# Patient Record
Sex: FEMALE | Race: WHITE | NOT HISPANIC OR LATINO | Employment: OTHER | URBAN - METROPOLITAN AREA
[De-identification: names, ages, dates, MRNs, and addresses within clinical notes are randomized per-mention and may not be internally consistent; named-entity substitution may affect disease eponyms.]

---

## 2021-07-27 ENCOUNTER — HOSPITAL ENCOUNTER (OUTPATIENT)
Dept: GENERAL RADIOLOGY | Facility: HOSPITAL | Age: 76
Discharge: HOME | End: 2021-07-27
Admitting: PHYSICIAN ASSISTANT

## 2021-07-27 ENCOUNTER — TRANSCRIBE ORDERS (OUTPATIENT)
Dept: ADMINISTRATIVE | Facility: HOSPITAL | Age: 76
End: 2021-07-27

## 2021-07-27 DIAGNOSIS — R07.81 RIB PAIN ON LEFT SIDE: Primary | ICD-10-CM

## 2021-07-27 PROCEDURE — 71101 X-RAY EXAM UNILAT RIBS/CHEST: CPT

## 2021-08-12 ENCOUNTER — TRANSCRIBE ORDERS (OUTPATIENT)
Dept: ADMINISTRATIVE | Facility: HOSPITAL | Age: 76
End: 2021-08-12

## 2021-08-12 DIAGNOSIS — R27.8 DECREASED COORDINATION: Primary | ICD-10-CM

## 2021-09-14 ENCOUNTER — HOSPITAL ENCOUNTER (OUTPATIENT)
Dept: GENERAL RADIOLOGY | Facility: HOSPITAL | Age: 76
Discharge: HOME | End: 2021-09-14
Admitting: FAMILY MEDICINE

## 2021-09-14 ENCOUNTER — TRANSCRIBE ORDERS (OUTPATIENT)
Dept: ADMINISTRATIVE | Facility: HOSPITAL | Age: 76
End: 2021-09-14

## 2021-09-14 DIAGNOSIS — S22.42XA: Primary | ICD-10-CM

## 2021-09-14 DIAGNOSIS — S82.92XA: Primary | ICD-10-CM

## 2021-09-14 PROCEDURE — 71110 X-RAY EXAM RIBS BIL 3 VIEWS: CPT

## 2021-09-15 ENCOUNTER — OFFICE VISIT (OUTPATIENT)
Dept: NEUROLOGY | Facility: CLINIC | Age: 76
End: 2021-09-15

## 2021-09-15 ENCOUNTER — LAB (OUTPATIENT)
Dept: LAB | Facility: HOSPITAL | Age: 76
End: 2021-09-15

## 2021-09-15 VITALS
SYSTOLIC BLOOD PRESSURE: 124 MMHG | DIASTOLIC BLOOD PRESSURE: 78 MMHG | HEIGHT: 64 IN | BODY MASS INDEX: 25.61 KG/M2 | HEART RATE: 65 BPM | OXYGEN SATURATION: 98 % | WEIGHT: 150 LBS

## 2021-09-15 DIAGNOSIS — H34.9 RETINAL ARTERY OCCLUSION: ICD-10-CM

## 2021-09-15 DIAGNOSIS — R26.89 BALANCE DISORDER: Primary | ICD-10-CM

## 2021-09-15 DIAGNOSIS — R26.9 GAIT DISTURBANCE: ICD-10-CM

## 2021-09-15 DIAGNOSIS — F40.240 CLAUSTROPHOBIA: ICD-10-CM

## 2021-09-15 DIAGNOSIS — R00.2 PALPITATIONS: ICD-10-CM

## 2021-09-15 DIAGNOSIS — R07.89 OTHER CHEST PAIN: ICD-10-CM

## 2021-09-15 PROBLEM — H52.4 PRESBYOPIA: Status: ACTIVE | Noted: 2021-07-20

## 2021-09-15 PROBLEM — H47.393 SUSPICIOUS OPTIC NERVE CUPPING OF BOTH EYES: Status: ACTIVE | Noted: 2021-07-20

## 2021-09-15 PROBLEM — H34.11 CRAO (CENTRAL RETINAL ARTERY OCCLUSION), RIGHT: Status: ACTIVE | Noted: 2021-07-20

## 2021-09-15 PROBLEM — M79.7 FIBROMYALGIA: Status: ACTIVE | Noted: 2021-07-16

## 2021-09-15 PROBLEM — M81.0 OSTEOPOROSIS: Status: ACTIVE | Noted: 2021-07-16

## 2021-09-15 PROBLEM — H52.03 HYPEROPIA OF BOTH EYES: Status: ACTIVE | Noted: 2021-07-20

## 2021-09-15 PROBLEM — H25.813 COMBINED FORMS OF AGE-RELATED CATARACT OF BOTH EYES: Status: ACTIVE | Noted: 2021-07-20

## 2021-09-15 PROBLEM — H40.023 OPEN ANGLE WITH BORDERLINE FINDINGS, HIGH RISK, BILATERAL: Status: ACTIVE | Noted: 2021-07-20

## 2021-09-15 LAB
ALBUMIN SERPL-MCNC: 5.2 G/DL (ref 3.5–5.2)
ALBUMIN/GLOB SERPL: 2.4 G/DL
ALP SERPL-CCNC: 77 U/L (ref 39–117)
ALT SERPL W P-5'-P-CCNC: 30 U/L (ref 1–33)
ANION GAP SERPL CALCULATED.3IONS-SCNC: 12.4 MMOL/L (ref 5–15)
AST SERPL-CCNC: 22 U/L (ref 1–32)
BASOPHILS # BLD AUTO: 0.09 10*3/MM3 (ref 0–0.2)
BASOPHILS NFR BLD AUTO: 1 % (ref 0–1.5)
BILIRUB SERPL-MCNC: 0.4 MG/DL (ref 0–1.2)
BUN SERPL-MCNC: 13 MG/DL (ref 8–23)
BUN/CREAT SERPL: 12.6 (ref 7–25)
CALCIUM SPEC-SCNC: 9.9 MG/DL (ref 8.6–10.5)
CHLORIDE SERPL-SCNC: 100 MMOL/L (ref 98–107)
CHROMATIN AB SERPL-ACNC: <10 IU/ML (ref 0–14)
CO2 SERPL-SCNC: 26.6 MMOL/L (ref 22–29)
CREAT SERPL-MCNC: 1.03 MG/DL (ref 0.57–1)
CRP SERPL-MCNC: <0.3 MG/DL (ref 0–0.5)
DEPRECATED RDW RBC AUTO: 44.9 FL (ref 37–54)
EOSINOPHIL # BLD AUTO: 0.41 10*3/MM3 (ref 0–0.4)
EOSINOPHIL NFR BLD AUTO: 4.5 % (ref 0.3–6.2)
ERYTHROCYTE [DISTWIDTH] IN BLOOD BY AUTOMATED COUNT: 12.7 % (ref 12.3–15.4)
ERYTHROCYTE [SEDIMENTATION RATE] IN BLOOD: 19 MM/HR (ref 0–30)
GFR SERPL CREATININE-BSD FRML MDRD: 52 ML/MIN/1.73
GLOBULIN UR ELPH-MCNC: 2.2 GM/DL
GLUCOSE SERPL-MCNC: 83 MG/DL (ref 65–99)
HCT VFR BLD AUTO: 44.9 % (ref 34–46.6)
HGB BLD-MCNC: 14.5 G/DL (ref 12–15.9)
IMM GRANULOCYTES # BLD AUTO: 0.04 10*3/MM3 (ref 0–0.05)
IMM GRANULOCYTES NFR BLD AUTO: 0.4 % (ref 0–0.5)
LYMPHOCYTES # BLD AUTO: 1.85 10*3/MM3 (ref 0.7–3.1)
LYMPHOCYTES NFR BLD AUTO: 20.3 % (ref 19.6–45.3)
MCH RBC QN AUTO: 31.5 PG (ref 26.6–33)
MCHC RBC AUTO-ENTMCNC: 32.3 G/DL (ref 31.5–35.7)
MCV RBC AUTO: 97.6 FL (ref 79–97)
MONOCYTES # BLD AUTO: 0.66 10*3/MM3 (ref 0.1–0.9)
MONOCYTES NFR BLD AUTO: 7.2 % (ref 5–12)
NEUTROPHILS NFR BLD AUTO: 6.06 10*3/MM3 (ref 1.7–7)
NEUTROPHILS NFR BLD AUTO: 66.6 % (ref 42.7–76)
NRBC BLD AUTO-RTO: 0 /100 WBC (ref 0–0.2)
PLATELET # BLD AUTO: 240 10*3/MM3 (ref 140–450)
PMV BLD AUTO: 11.4 FL (ref 6–12)
POTASSIUM SERPL-SCNC: 4.5 MMOL/L (ref 3.5–5.2)
PROT SERPL-MCNC: 7.4 G/DL (ref 6–8.5)
RBC # BLD AUTO: 4.6 10*6/MM3 (ref 3.77–5.28)
SODIUM SERPL-SCNC: 139 MMOL/L (ref 136–145)
WBC # BLD AUTO: 9.11 10*3/MM3 (ref 3.4–10.8)

## 2021-09-15 PROCEDURE — 86255 FLUORESCENT ANTIBODY SCREEN: CPT

## 2021-09-15 PROCEDURE — 84446 ASSAY OF VITAMIN E: CPT

## 2021-09-15 PROCEDURE — 83519 RIA NONANTIBODY: CPT

## 2021-09-15 PROCEDURE — 82390 ASSAY OF CERULOPLASMIN: CPT

## 2021-09-15 PROCEDURE — 36415 COLL VENOUS BLD VENIPUNCTURE: CPT

## 2021-09-15 PROCEDURE — 86038 ANTINUCLEAR ANTIBODIES: CPT

## 2021-09-15 PROCEDURE — 82746 ASSAY OF FOLIC ACID SERUM: CPT

## 2021-09-15 PROCEDURE — 81241 F5 GENE: CPT

## 2021-09-15 PROCEDURE — 82164 ANGIOTENSIN I ENZYME TEST: CPT

## 2021-09-15 PROCEDURE — 85652 RBC SED RATE AUTOMATED: CPT

## 2021-09-15 PROCEDURE — 85025 COMPLETE CBC W/AUTO DIFF WBC: CPT

## 2021-09-15 PROCEDURE — 86431 RHEUMATOID FACTOR QUANT: CPT

## 2021-09-15 PROCEDURE — 82607 VITAMIN B-12: CPT

## 2021-09-15 PROCEDURE — 99204 OFFICE O/P NEW MOD 45 MIN: CPT | Performed by: NURSE PRACTITIONER

## 2021-09-15 PROCEDURE — 80053 COMPREHEN METABOLIC PANEL: CPT

## 2021-09-15 PROCEDURE — 86140 C-REACTIVE PROTEIN: CPT

## 2021-09-15 RX ORDER — BUPROPION HYDROCHLORIDE 300 MG/1
300 TABLET ORAL EVERY MORNING
COMMUNITY
Start: 2021-06-30

## 2021-09-15 RX ORDER — METOPROLOL SUCCINATE 100 MG/1
50 TABLET, EXTENDED RELEASE ORAL DAILY
COMMUNITY
End: 2022-03-15 | Stop reason: SDUPTHER

## 2021-09-15 RX ORDER — ASPIRIN 81 MG/1
1 TABLET ORAL DAILY
COMMUNITY

## 2021-09-15 RX ORDER — MELATONIN: COMMUNITY

## 2021-09-15 RX ORDER — DIAZEPAM 5 MG/1
TABLET ORAL
Qty: 4 TABLET | Refills: 0 | Status: SHIPPED | OUTPATIENT
Start: 2021-09-15 | End: 2021-11-18

## 2021-09-15 RX ORDER — OLMESARTAN MEDOXOMIL 40 MG/1
40 TABLET ORAL DAILY
COMMUNITY
Start: 2021-05-27 | End: 2021-10-26 | Stop reason: SDUPTHER

## 2021-09-15 RX ORDER — APIXABAN 5 MG/1
5 TABLET, FILM COATED ORAL 2 TIMES DAILY
COMMUNITY
Start: 2021-07-16

## 2021-09-15 RX ORDER — ESCITALOPRAM OXALATE 20 MG/1
20 TABLET ORAL NIGHTLY
COMMUNITY

## 2021-09-15 RX ORDER — CHLORAL HYDRATE 500 MG
CAPSULE ORAL
COMMUNITY

## 2021-09-15 RX ORDER — BUSPIRONE HYDROCHLORIDE 10 MG/1
10 TABLET ORAL 3 TIMES DAILY
COMMUNITY
Start: 2021-08-02

## 2021-09-15 RX ORDER — TRAMADOL HYDROCHLORIDE 50 MG/1
50 TABLET ORAL EVERY 8 HOURS PRN
COMMUNITY
End: 2021-11-18

## 2021-09-15 RX ORDER — DULOXETIN HYDROCHLORIDE 30 MG/1
30 CAPSULE, DELAYED RELEASE ORAL DAILY
COMMUNITY
Start: 2021-09-09 | End: 2021-11-18

## 2021-09-15 RX ORDER — TIMOLOL MALEATE 5 MG/ML
1 SOLUTION/ DROPS OPHTHALMIC DAILY
COMMUNITY

## 2021-09-15 RX ORDER — ATORVASTATIN CALCIUM 10 MG/1
10 TABLET, FILM COATED ORAL
COMMUNITY
Start: 2021-08-02 | End: 2022-02-22 | Stop reason: ALTCHOICE

## 2021-09-15 RX ORDER — ZOLPIDEM TARTRATE 12.5 MG/1
12.5 TABLET, FILM COATED, EXTENDED RELEASE ORAL NIGHTLY
COMMUNITY
Start: 2021-07-01

## 2021-09-15 RX ORDER — FOLIC ACID 1 MG/1
1 TABLET ORAL DAILY
COMMUNITY
Start: 2021-06-02

## 2021-09-15 RX ORDER — LATANOPROST 50 UG/ML
SOLUTION/ DROPS OPHTHALMIC
COMMUNITY

## 2021-09-15 RX ORDER — DENOSUMAB 60 MG/ML
1 INJECTION SUBCUTANEOUS
COMMUNITY

## 2021-09-15 NOTE — PROGRESS NOTES
Neuro Office Visit      Encounter Date: 09/15/2021   Patient Name: Marilyn Bernal  : 1945   MRN: [id number]   PCP: Dr Colleen Redyd- THOMAS  Chief Complaint:    Chief Complaint   Patient presents with   • Balance Issues     NP       History of Present Illness: Marilyn Bernal is a 75 y.o. female who is here today in Neurology with her  for balance problems and unsteady gait.    Balance problems  Onset 2019 after arm fracture. Symptoms are progressing.  Has had multiple falls mostly at night. Has some dizziness upon standing that clears spontaneously in a few seconds. This was thought to be orthostatic in nature.     Her major symptoms is poor balance. It is worse when she is outside in the yard and stands up. She feels she is swaying and symptoms are exacerbated if she turns her head. Symptoms are better in the house or hallway. No nystagmus. No nausea. Episodes will last until she sits down. She is chronically weak.  She does not use a cane or a walker. After a pelvic fracture she used a walker and did not have balance issues.     Retinal artery occlusion  History or right retinal artery occlusion with loss of near 100% vision on right. Can see some shadows in right lateral peripheral vision. Did not have CT or MRI due to severe claustrophobia. Has been on Eliquis, and statin since 2017.  She has limited hearing in right ear.    Chest pain  Had had some chest pain and tachycardia prior to retinal occlusion. Saw cardiology. Started on beta blocker. Did not wear a holter. Did have Echo.     One week ago had chest pain. Pressure sensation in central chest and radiated to left and right chest.  Occurred at rest and with exertion. Not worse with exertion.  Worse with movement. + SOB. Neg palpitations, nausea or diaphoresis. Symptom duration 3-10 minutes. Denies cp today.      Has lupus, depression that is not well controlled. Has not seen a psychiatrist and declines referral at this time. Moderate  etoh use.      Subjective      Past Medical History:   Past Medical History:   Diagnosis Date   • Abnormal gait     Reported by patient-slower, smaller steps   • Anxiety    • Arthritis    • Cataracts, bilateral    • Fibromyalgia, primary    • Fracture    • HL (hearing loss)    • Hypertension    • IBS (irritable bowel syndrome)    • Migraine        Past Surgical History:   Past Surgical History:   Procedure Laterality Date   • LIPOMA EXCISION      Removal-1984 in [facility]   • MIDDLE EAR SURGERY  1967    [place], [place]       Family History:   Family History   Problem Relation Age of Onset   • Cancer Mother    • Osteoporosis Mother    • Stroke Mother    • Heart attack Father    • Migraines Son        Social History:   Social History     Socioeconomic History   • Marital status:      Spouse name: Not on file   • Number of children: Not on file   • Years of education: Not on file   • Highest education level: Not on file   Tobacco Use   • Smoking status: Never Smoker   • Smokeless tobacco: Never Used   Vaping Use   • Vaping Use: Never used   Substance and Sexual Activity   • Alcohol use: Yes     Alcohol/week: 7.0 standard drinks     Types: 7 Standard drinks or equivalent per week   • Drug use: Never   • Sexual activity: Defer       Medications:     Current Outpatient Medications:   •  aspirin (aspirin) 81 MG EC tablet, Take 1 tablet by mouth Daily., Disp: , Rfl:   •  atorvastatin (LIPITOR) 10 MG tablet, Take 10 mg by mouth every night at bedtime., Disp: , Rfl:   •  buPROPion XL (WELLBUTRIN XL) 300 MG 24 hr tablet, bupropion HCl  mg 24 hr tablet, extended release, Disp: , Rfl:   •  busPIRone (BUSPAR) 10 MG tablet, Take 10 mg by mouth 3 (Three) Times a Day., Disp: , Rfl:   •  calcium carbonate-vitamin d 600-400 MG-UNIT per tablet, bid, Disp: , Rfl:   •  cholecalciferol (Cholecalciferol) 25 MCG (1000 UT) tablet, once daily, Disp: , Rfl:   •  denosumab (Prolia) 60 MG/ML solution prefilled syringe  syringe, 1 mL., Disp: , Rfl:   •  Diclofenac Sodium (VOLTAREN) 1 % gel gel, diclofenac 1 % topical gel, Disp: , Rfl:   •  DULoxetine (CYMBALTA) 30 MG capsule, Take 30 mg by mouth Daily., Disp: , Rfl:   •  Eliquis 5 MG tablet tablet, Take 5 mg by mouth 2 (Two) Times a Day., Disp: , Rfl:   •  escitalopram (LEXAPRO) 20 MG tablet, escitalopram 20 mg tablet, Disp: , Rfl:   •  folic acid (FOLVITE) 1 MG tablet, folic acid 1 mg tablet, Disp: , Rfl:   •  latanoprost (XALATAN) 0.005 % ophthalmic solution, latanoprost 0.005 % eye drops  INSTILL 1 DROP INTO AFFECTED EYE(S) BY OPHTHALMIC ROUTE ONCE DAILY INTHE EVENING, Disp: , Rfl:   •  metoprolol succinate XL (TOPROL-XL) 100 MG 24 hr tablet, metoprolol succinate  mg tablet,extended release 24 hr, Disp: , Rfl:   •  olmesartan (BENICAR) 40 MG tablet, olmesartan 40 mg tablet, Disp: , Rfl:   •  Omega-3 Fatty Acids (fish oil) 1000 MG capsule capsule, Take  by mouth Daily With Breakfast., Disp: , Rfl:   •  timolol (TIMOPTIC) 0.5 % ophthalmic solution, timolol 0.5 % eye drops  INSTILL 1 DROP INTO AFFECTED EYE(S) BY OPHTHALMIC ROUTE 2 TIMES PER DAY, Disp: , Rfl:   •  traMADol (ULTRAM) 50 MG tablet, tramadol 50 mg tablet, Disp: , Rfl:   •  zolpidem CR (AMBIEN CR) 12.5 MG CR tablet, zolpidem ER 12.5 mg tablet,extended release,multiphase, Disp: , Rfl:   •  diazePAM (Valium) 5 MG tablet, Take 1 tab 1 hours before procedure., Disp: 4 tablet, Rfl: 0    Allergies: KNDA      STEADI: high risk for falls.  Objective     Physical Exam:   Physical Exam  Neurological:      Mental Status: She is oriented to person, place, and time.      Coordination: Romberg Test abnormal. Finger-Nose-Finger Test normal.      Gait: Tandem walk abnormal.      Deep Tendon Reflexes:      Reflex Scores:       Tricep reflexes are 2+ on the right side and 2+ on the left side.       Bicep reflexes are 2+ on the right side and 2+ on the left side.       Brachioradialis reflexes are 2+ on the right side and 2+ on the  left side.       Patellar reflexes are 2+ on the right side and 2+ on the left side.       Achilles reflexes are 2+ on the right side and 2+ on the left side.  Psychiatric:         Speech: Speech normal.         Neurologic Exam     Mental Status   Oriented to person, place, and time.   Follows 3 step commands.   Attention: normal. Concentration: normal.   Speech: speech is normal   Level of consciousness: alert  Knowledge: consistent with education.   Normal comprehension.     Cranial Nerves     CN II   Visual acuity: decreased  Right visual field deficit: lower temporal, upper temporal, lower nasal and upper nasal quadrant(s)    CN III, IV, VI   Right pupil: Size: 3 mm. Shape: regular. Reactivity: non-reactive. Consensual response: intact. Accommodation: intact.   Left pupil: Size: 3 mm. Shape: regular. Reactivity: brisk. Consensual response: intact. Accommodation: intact.   CN III: no CN III palsy  CN VI: no CN VI palsy  Nystagmus: none   Diplopia: none  Upgaze: normal  Downgaze: normal  Conjugate gaze: present    CN VII   Facial expression full, symmetric.     CN VIII   Hearing: intact    CN XII   CN XII normal.     Motor Exam   Muscle bulk: normal  Overall muscle tone: normal    Strength   Right biceps: 5/5  Left biceps: 5/5  Right triceps: 5/5  Left triceps: 5/5  Right interossei: 5/5  Left interossei: 5/5  Right quadriceps: 5/5  Left quadriceps: 5/5  Right anterior tibial: 5/5  Left anterior tibial: 5/5  Right posterior tibial: 5/5  Left posterior tibial: 5/5    Sensory Exam   Light touch normal.     Gait, Coordination, and Reflexes     Gait  Gait: circumduction and wide-based    Coordination   Romberg: positive  Finger to nose coordination: normal  Tandem walking coordination: abnormal    Tremor   Resting tremor: absent  Action tremor: absent    Reflexes   Right brachioradialis: 2+  Left brachioradialis: 2+  Right biceps: 2+  Left biceps: 2+  Right triceps: 2+  Left triceps: 2+  Right patellar: 2+  Left  "patellar: 2+  Right achilles: 2+  Left achilles: 2+  Right : 2+  Left : 2+Right leg circumduction        Vital Signs:   Vitals:    09/15/21 1004   BP: 124/78   Pulse: 65   SpO2: 98%   Weight: 68 kg (150 lb)   Height: 162.6 cm (64\")     Body mass index is 25.75 kg/m².       Assessment / Plan      Assessment/Plan:   Diagnoses and all orders for this visit:    1. Balance disorder (Primary)  -     MRI Brain With & Without Contrast; Future  -     Vascular Screening (Carotid) CAR; Future  -     Ambulatory Referral to Physical Therapy Evaluate and treat, Vestibular, Neuro    2. Gait disturbance  -     MRI Brain With & Without Contrast; Future  -     DARLING by IFA, Reflex 9-biomarkers profile; Future  -     Angiotensin Converting Enzyme; Future  -     Anti-Myelin Oligodendrocyte Glycoprotein (MOG), Serum; Future  -     CBC & Differential; Future  -     Comprehensive Metabolic Panel; Future  -     Factor 5 Leiden; Future  -     Myasthenia Gravis Full Panel w/MuSK Reflex; Future  -     Neuromyelitis Optica (NMO) Auto Antibody, IgG; Future  -     Rheumatoid Factor; Future  -     Vitamin B12 & Folate; Future  -     C-reactive Protein; Future  -     Sedimentation Rate; Future  -     Ceruloplasmin; Future  -     Vitamin E; Future  -     Ambulatory Referral to Physical Therapy Evaluate and treat, Vestibular, Neuro    3. Retinal artery occlusion  -     MRI Brain With & Without Contrast; Future  -     Vascular Screening (Carotid) CAR; Future  -     Adult Transthoracic Echo Complete W/ Cont if Necessary Per Protocol; Future  -     Factor 5 Leiden; Future  -     Ambulatory Referral to Cardiology    4. Other chest pain  -     Holter Monitor - 72 Hour Up To 15 Days; Future  -     Adult Transthoracic Echo Complete W/ Cont if Necessary Per Protocol; Future  -     Ambulatory Referral to Cardiology    5. Palpitations  -     Vascular Screening (Carotid) CAR; Future  -     Holter Monitor - 72 Hour Up To 15 Days; Future  -     Adult " Transthoracic Echo Complete W/ Cont if Necessary Per Protocol; Future  -     Ambulatory Referral to Cardiology    6. Claustrophobia  -     diazePAM (Valium) 5 MG tablet; Take 1 tab 1 hours before procedure.  Dispense: 4 tablet; Refill: 0       Discussed importance of obtaining MRI to make accurate diagnosis and plan of care. Will premedicate with Valium. Trial first dose at home the day before. May repeat in one hour if not effective.    As a part of this patient's therapy a controlled substance was prescribed. Instructed on the safe and proper use of this medication along with potential risks. Controlled substance contract signed and scanned. Lorenzo will be reviewed and UDS obtained as indicated.      Patient Education:   Stressed the importance of safety and recommend she use a walker at all times.    Go to ER for recurrent chest pain.  Reviewed signs and symptoms of heart attack and stroke.     Reviewed medications, potential side effects and signs and symptoms to report. Discussed risk versus benefits of treatment plan with patient and/or family-including medications, labs and radiology that may be ordered. Addressed questions and concerns during visit. Patient and/or family verbalized understanding and agree with plan. Instructed to call the office with any questions and report to ER with any life-threatening symptoms.     Follow Up:   6 weeks    During this visit the following were done:  Labs Reviewed [x]    Labs Ordered [x]    Radiology Reports Reviewed []    Radiology Ordered [x]    PCP Records Reviewed [x]    Referring Provider Records Reviewed []    ER Records Reviewed []    Hospital Records Reviewed []    History Obtained From Family [x]    Radiology Images Reviewed []    Other Reviewed [x]    Records Requested []      Thaddeus Milligan, DNP, APRN

## 2021-09-16 LAB
CERULOPLASMIN SERPL-MCNC: 33 MG/DL (ref 19–39)
FOLATE SERPL-MCNC: >20 NG/ML (ref 4.78–24.2)
VIT B12 BLD-MCNC: 990 PG/ML (ref 211–946)

## 2021-09-17 LAB
ACE SERPL-CCNC: 44 U/L (ref 14–82)
ANA TITR SER IF: NEGATIVE {TITER}
F5 GENE MUT ANL BLD/T: NORMAL
LABORATORY COMMENT REPORT: NORMAL

## 2021-09-20 DIAGNOSIS — R00.2 PALPITATIONS: ICD-10-CM

## 2021-09-20 DIAGNOSIS — R26.89 BALANCE DISORDER: Primary | ICD-10-CM

## 2021-09-20 DIAGNOSIS — H34.9 RETINAL ARTERY OCCLUSION: ICD-10-CM

## 2021-09-20 LAB — MOG AB SER QL CBA IFA: NEGATIVE

## 2021-09-22 LAB
A-TOCOPHEROL VIT E SERPL-MCNC: 15.9 MG/L (ref 9–29)
AQP4 H2O CHANNEL IGG SERPL QL: NEGATIVE
GAMMA TOCOPHEROL SERPL-MCNC: 0.9 MG/L (ref 0.5–4.9)

## 2021-09-29 ENCOUNTER — TELEPHONE (OUTPATIENT)
Dept: NEUROLOGY | Facility: CLINIC | Age: 76
End: 2021-09-29

## 2021-10-06 ENCOUNTER — APPOINTMENT (OUTPATIENT)
Dept: MRI IMAGING | Facility: HOSPITAL | Age: 76
End: 2021-10-06

## 2021-10-10 ENCOUNTER — APPOINTMENT (OUTPATIENT)
Dept: MRI IMAGING | Facility: HOSPITAL | Age: 76
End: 2021-10-10

## 2021-10-15 ENCOUNTER — TRANSCRIBE ORDERS (OUTPATIENT)
Dept: ADMINISTRATIVE | Facility: HOSPITAL | Age: 76
End: 2021-10-15

## 2021-10-15 DIAGNOSIS — Z11.59 ENCOUNTER FOR SCREENING FOR VIRAL DISEASE: Primary | ICD-10-CM

## 2021-10-19 ENCOUNTER — HOSPITAL ENCOUNTER (OUTPATIENT)
Dept: CARDIOLOGY | Facility: HOSPITAL | Age: 76
Discharge: HOME | End: 2021-10-19

## 2021-10-19 VITALS — BODY MASS INDEX: 25.61 KG/M2 | HEIGHT: 64 IN | WEIGHT: 150 LBS

## 2021-10-19 DIAGNOSIS — R00.2 PALPITATIONS: ICD-10-CM

## 2021-10-19 DIAGNOSIS — H34.9 RETINAL ARTERY OCCLUSION: ICD-10-CM

## 2021-10-19 DIAGNOSIS — R07.89 OTHER CHEST PAIN: ICD-10-CM

## 2021-10-19 DIAGNOSIS — R26.89 BALANCE DISORDER: ICD-10-CM

## 2021-10-19 LAB
ASCENDING AORTA: 3.3 CM
BH CV ECHO MEAS - AO MAX PG (FULL): 4.3 MMHG
BH CV ECHO MEAS - AO MAX PG: 8 MMHG
BH CV ECHO MEAS - AO MEAN PG (FULL): 2 MMHG
BH CV ECHO MEAS - AO MEAN PG: 3.7 MMHG
BH CV ECHO MEAS - AO ROOT AREA (BSA CORRECTED): 1.6
BH CV ECHO MEAS - AO ROOT AREA: 6 CM^2
BH CV ECHO MEAS - AO ROOT DIAM: 2.8 CM
BH CV ECHO MEAS - AO V2 MAX: 141.2 CM/SEC
BH CV ECHO MEAS - AO V2 MEAN: 88.2 CM/SEC
BH CV ECHO MEAS - AO V2 VTI: 30.7 CM
BH CV ECHO MEAS - ASC AORTA: 3.3 CM
BH CV ECHO MEAS - AVA(I,A): 2.4 CM^2
BH CV ECHO MEAS - AVA(I,D): 2.4 CM^2
BH CV ECHO MEAS - AVA(V,A): 2.1 CM^2
BH CV ECHO MEAS - AVA(V,D): 2.1 CM^2
BH CV ECHO MEAS - BSA(HAYCOCK): 1.8 M^2
BH CV ECHO MEAS - BSA: 1.7 M^2
BH CV ECHO MEAS - BZI_BMI: 25.7 KILOGRAMS/M^2
BH CV ECHO MEAS - BZI_METRIC_HEIGHT: 162.6 CM
BH CV ECHO MEAS - BZI_METRIC_WEIGHT: 68 KG
BH CV ECHO MEAS - EDV(CUBED): 47.3 ML
BH CV ECHO MEAS - EDV(MOD-SP2): 45 ML
BH CV ECHO MEAS - EDV(MOD-SP4): 51 ML
BH CV ECHO MEAS - EDV(TEICH): 55.1 ML
BH CV ECHO MEAS - EF(CUBED): 80.8 %
BH CV ECHO MEAS - EF(MOD-BP): 63 %
BH CV ECHO MEAS - EF(MOD-SP2): 64.4 %
BH CV ECHO MEAS - EF(MOD-SP4): 62.7 %
BH CV ECHO MEAS - EF(TEICH): 74.3 %
BH CV ECHO MEAS - ESV(CUBED): 9.1 ML
BH CV ECHO MEAS - ESV(MOD-SP2): 16 ML
BH CV ECHO MEAS - ESV(MOD-SP4): 19 ML
BH CV ECHO MEAS - ESV(TEICH): 14.2 ML
BH CV ECHO MEAS - FS: 42.4 %
BH CV ECHO MEAS - IVS/LVPW: 0.84
BH CV ECHO MEAS - IVSD: 1 CM
BH CV ECHO MEAS - LA DIMENSION: 3.3 CM
BH CV ECHO MEAS - LA/AO: 1.2
BH CV ECHO MEAS - LAD MAJOR: 5.1 CM
BH CV ECHO MEAS - LAT PEAK E' VEL: 6.7 CM/SEC
BH CV ECHO MEAS - LATERAL E/E' RATIO: 10.7
BH CV ECHO MEAS - LV DIASTOLIC VOL/BSA (35-75): 29.5 ML/M^2
BH CV ECHO MEAS - LV IVRT: 0.06 SEC
BH CV ECHO MEAS - LV MASS(C)D: 115.5 GRAMS
BH CV ECHO MEAS - LV MASS(C)DI: 66.7 GRAMS/M^2
BH CV ECHO MEAS - LV MAX PG: 3.7 MMHG
BH CV ECHO MEAS - LV MEAN PG: 1.7 MMHG
BH CV ECHO MEAS - LV SYSTOLIC VOL/BSA (12-30): 11 ML/M^2
BH CV ECHO MEAS - LV V1 MAX: 95.6 CM/SEC
BH CV ECHO MEAS - LV V1 MEAN: 57.5 CM/SEC
BH CV ECHO MEAS - LV V1 VTI: 24.1 CM
BH CV ECHO MEAS - LVIDD: 3.6 CM
BH CV ECHO MEAS - LVIDS: 2.1 CM
BH CV ECHO MEAS - LVLD AP2: 6 CM
BH CV ECHO MEAS - LVLD AP4: 6.2 CM
BH CV ECHO MEAS - LVLS AP2: 4.6 CM
BH CV ECHO MEAS - LVLS AP4: 5 CM
BH CV ECHO MEAS - LVOT AREA (M): 3.1 CM^2
BH CV ECHO MEAS - LVOT AREA: 3 CM^2
BH CV ECHO MEAS - LVOT DIAM: 2 CM
BH CV ECHO MEAS - LVPWD: 1.1 CM
BH CV ECHO MEAS - MED PEAK E' VEL: 5 CM/SEC
BH CV ECHO MEAS - MEDIAL E/E' RATIO: 14.2
BH CV ECHO MEAS - MV A MAX VEL: 101.8 CM/SEC
BH CV ECHO MEAS - MV DEC SLOPE: 299.7 CM/SEC^2
BH CV ECHO MEAS - MV DEC TIME: 0.25 SEC
BH CV ECHO MEAS - MV E MAX VEL: 73.3 CM/SEC
BH CV ECHO MEAS - MV E/A: 0.72
BH CV ECHO MEAS - MV MAX PG: 6.1 MMHG
BH CV ECHO MEAS - MV MEAN PG: 2.2 MMHG
BH CV ECHO MEAS - MV P1/2T MAX VEL: 93.1 CM/SEC
BH CV ECHO MEAS - MV P1/2T: 91 MSEC
BH CV ECHO MEAS - MV V2 MAX: 123.3 CM/SEC
BH CV ECHO MEAS - MV V2 MEAN: 68.3 CM/SEC
BH CV ECHO MEAS - MV V2 VTI: 29.4 CM
BH CV ECHO MEAS - MVA P1/2T LCG: 2.4 CM^2
BH CV ECHO MEAS - MVA(P1/2T): 2.4 CM^2
BH CV ECHO MEAS - MVA(VTI): 2.5 CM^2
BH CV ECHO MEAS - PA ACC SLOPE: 434.6 CM/SEC^2
BH CV ECHO MEAS - PA ACC TIME: 0.18 SEC
BH CV ECHO MEAS - PA MAX PG: 3.5 MMHG
BH CV ECHO MEAS - PA PR(ACCEL): -0.22 MMHG
BH CV ECHO MEAS - PA V2 MAX: 93.9 CM/SEC
BH CV ECHO MEAS - PI END-D VEL: 108.6 CM/SEC
BH CV ECHO MEAS - RAP SYSTOLE: 3 MMHG
BH CV ECHO MEAS - RVSP: 31 MMHG
BH CV ECHO MEAS - SI(AO): 105.9 ML/M^2
BH CV ECHO MEAS - SI(CUBED): 22.1 ML/M^2
BH CV ECHO MEAS - SI(LVOT): 42.3 ML/M^2
BH CV ECHO MEAS - SI(MOD-SP2): 16.8 ML/M^2
BH CV ECHO MEAS - SI(MOD-SP4): 18.5 ML/M^2
BH CV ECHO MEAS - SI(TEICH): 23.6 ML/M^2
BH CV ECHO MEAS - SV(AO): 183.3 ML
BH CV ECHO MEAS - SV(CUBED): 38.3 ML
BH CV ECHO MEAS - SV(LVOT): 73.3 ML
BH CV ECHO MEAS - SV(MOD-SP2): 29 ML
BH CV ECHO MEAS - SV(MOD-SP4): 32 ML
BH CV ECHO MEAS - SV(TEICH): 40.9 ML
BH CV ECHO MEAS - TAPSE (>1.6): 2.4 CM
BH CV ECHO MEAS - TR MAX PG: 28 MMHG
BH CV ECHO MEAS - TR MAX VEL: 263.9 CM/SEC
BH CV ECHO MEASUREMENTS AVERAGE E/E' RATIO: 12.53
BH CV VAS BP LEFT ARM: NORMAL MMHG
BH CV XLRA - RV BASE: 2.8 CM
BH CV XLRA - RV LENGTH: 5.6 CM
BH CV XLRA - RV MID: 2.1 CM
BH CV XLRA - TDI S': 10.3 CM/SEC
BH CV XLRA MEAS LEFT CCA RATIO VEL: 80.5 CM/SEC
BH CV XLRA MEAS LEFT DIST CCA EDV: 21.6 CM/SEC
BH CV XLRA MEAS LEFT DIST CCA PSV: 84 CM/SEC
BH CV XLRA MEAS LEFT DIST ICA EDV: 21.6 CM/SEC
BH CV XLRA MEAS LEFT DIST ICA PSV: 70.3 CM/SEC
BH CV XLRA MEAS LEFT ICA RATIO VEL: 60.5 CM/SEC
BH CV XLRA MEAS LEFT ICA/CCA RATIO: 0.75
BH CV XLRA MEAS LEFT MID CCA EDV: 13.8 CM/SEC
BH CV XLRA MEAS LEFT MID CCA PSV: 81 CM/SEC
BH CV XLRA MEAS LEFT MID ICA EDV: 18.9 CM/SEC
BH CV XLRA MEAS LEFT MID ICA PSV: 58.9 CM/SEC
BH CV XLRA MEAS LEFT PROX CCA EDV: 22.6 CM/SEC
BH CV XLRA MEAS LEFT PROX CCA PSV: 101.2 CM/SEC
BH CV XLRA MEAS LEFT PROX ECA EDV: 14.9 CM/SEC
BH CV XLRA MEAS LEFT PROX ECA PSV: 93.2 CM/SEC
BH CV XLRA MEAS LEFT PROX ICA EDV: 15.3 CM/SEC
BH CV XLRA MEAS LEFT PROX ICA PSV: 60.9 CM/SEC
BH CV XLRA MEAS LEFT PROX SCLA PSV: 130 CM/SEC
BH CV XLRA MEAS LEFT VERTEBRAL A EDV: 6.5 CM/SEC
BH CV XLRA MEAS LEFT VERTEBRAL A PSV: 38.2 CM/SEC
BH CV XLRA MEAS RIGHT CCA RATIO VEL: 87.1 CM/SEC
BH CV XLRA MEAS RIGHT DIST CCA EDV: 23.2 CM/SEC
BH CV XLRA MEAS RIGHT DIST CCA PSV: 90.4 CM/SEC
BH CV XLRA MEAS RIGHT DIST ICA EDV: 24 CM/SEC
BH CV XLRA MEAS RIGHT DIST ICA PSV: 73.3 CM/SEC
BH CV XLRA MEAS RIGHT ICA RATIO VEL: 86 CM/SEC
BH CV XLRA MEAS RIGHT ICA/CCA RATIO: 0.99
BH CV XLRA MEAS RIGHT MID CCA EDV: 17.1 CM/SEC
BH CV XLRA MEAS RIGHT MID CCA PSV: 87.7 CM/SEC
BH CV XLRA MEAS RIGHT MID ICA EDV: 20.1 CM/SEC
BH CV XLRA MEAS RIGHT MID ICA PSV: 71.2 CM/SEC
BH CV XLRA MEAS RIGHT PROX CCA EDV: 16.3 CM/SEC
BH CV XLRA MEAS RIGHT PROX CCA PSV: 85.5 CM/SEC
BH CV XLRA MEAS RIGHT PROX ECA EDV: 16.3 CM/SEC
BH CV XLRA MEAS RIGHT PROX ECA PSV: 118.8 CM/SEC
BH CV XLRA MEAS RIGHT PROX ICA EDV: 19.3 CM/SEC
BH CV XLRA MEAS RIGHT PROX ICA PSV: 78.9 CM/SEC
BH CV XLRA MEAS RIGHT PROX SCLA PSV: 225 CM/SEC
BH CV XLRA MEAS RIGHT VERTEBRAL A EDV: 10.4 CM/SEC
BH CV XLRA MEAS RIGHT VERTEBRAL A PSV: 40.1 CM/SEC
LEFT ARM BP: NORMAL MMHG
LEFT ATRIUM VOLUME INDEX: 19.1 ML/M^2
LEFT ATRIUM VOLUME: 33 ML
LV EF 2D ECHO EST: 65 %
MAXIMAL PREDICTED HEART RATE: 146 BPM
RIGHT ARM BP: NORMAL MMHG
STRESS TARGET HR: 124 BPM

## 2021-10-19 PROCEDURE — 93880 EXTRACRANIAL BILAT STUDY: CPT

## 2021-10-19 PROCEDURE — 93306 TTE W/DOPPLER COMPLETE: CPT | Performed by: INTERNAL MEDICINE

## 2021-10-19 PROCEDURE — 93880 EXTRACRANIAL BILAT STUDY: CPT | Performed by: INTERNAL MEDICINE

## 2021-10-19 PROCEDURE — 93306 TTE W/DOPPLER COMPLETE: CPT

## 2021-10-21 ENCOUNTER — TELEPHONE (OUTPATIENT)
Dept: NEUROLOGY | Facility: CLINIC | Age: 76
End: 2021-10-21

## 2021-10-21 NOTE — TELEPHONE ENCOUNTER
----- Message from Thaddeus Milligan, LAILA, APRN sent at 10/19/2021  6:23 PM EDT -----  Please let him know there is no significant blockage in arteries of his neck.

## 2021-10-21 NOTE — TELEPHONE ENCOUNTER
Please let her know that her labs were normal but there is still one test out. We will call and check on the MG result.

## 2021-10-26 ENCOUNTER — OFFICE VISIT (OUTPATIENT)
Dept: CARDIOLOGY | Facility: CLINIC | Age: 76
End: 2021-10-26

## 2021-10-26 VITALS
HEIGHT: 64 IN | OXYGEN SATURATION: 97 % | BODY MASS INDEX: 25.78 KG/M2 | DIASTOLIC BLOOD PRESSURE: 62 MMHG | SYSTOLIC BLOOD PRESSURE: 118 MMHG | WEIGHT: 151 LBS | HEART RATE: 69 BPM

## 2021-10-26 DIAGNOSIS — R00.2 PALPITATIONS: ICD-10-CM

## 2021-10-26 DIAGNOSIS — I10 HYPERTENSION, UNSPECIFIED TYPE: Primary | ICD-10-CM

## 2021-10-26 PROCEDURE — 93000 ELECTROCARDIOGRAM COMPLETE: CPT | Performed by: INTERNAL MEDICINE

## 2021-10-26 PROCEDURE — 99204 OFFICE O/P NEW MOD 45 MIN: CPT | Performed by: INTERNAL MEDICINE

## 2021-10-26 RX ORDER — OLMESARTAN MEDOXOMIL 20 MG/1
20 TABLET ORAL DAILY
Qty: 30 TABLET | Refills: 6 | Status: SHIPPED | OUTPATIENT
Start: 2021-10-26 | End: 2022-05-16 | Stop reason: SDUPTHER

## 2021-10-26 RX ORDER — CITALOPRAM 20 MG/1
1 TABLET ORAL DAILY
COMMUNITY
End: 2021-11-18

## 2021-10-26 NOTE — PROGRESS NOTES
[facility] Cardiology   Consult  Marilyn Bernal  1945    VISIT DATE:  10/26/21    PCP:   Colleen Reddy MD  [id number] [address]        CC:  No chief complaint on file.      Problem List:  1.  Retinal artery occlusion  2.  Hypertension  3.  Hyperlipidemia  4.  Fall with prior pelvic and rib fractures    Cardiac testing:     Echo October 2021  · Estimated right ventricular systolic pressure from tricuspid regurgitation is normal (<35  mmHg). Calculated right ventricular systolic pressure from tricuspid regurgitation is 31  mmHg.  · Estimated left ventricular EF = 65% Estimated left ventricular EF was in agreement  with the calculated left ventricular EF. Left ventricular ejection fraction appears to be 61  - 65%. Left ventricular systolic function is normal.  · Left ventricular diastolic function is consistent with age.  · Normal right ventricular cavity size, wall thickness, systolic function and septal motion  noted.  · No evidence of pulmonary hypertension is present.  · There is no evidence of pericardial effusion.  · The aortic valve exhibits mild sclerosis.  · Saline test for shunting not performed.      Carotid duplex October 2021  · Right internal carotid artery stenosis of 0-49%.  · Left internal carotid artery stenosis of 0-49%.  · Vertebral artery flow is antegrade bilaterally  · Elevated velocities in the right subclavian artery suggesting a mild, less than 50% stenosis.            History of Present Illness:  Marilyn Bernal  Is a 75 y.o. female with pertinent cardiac history detailed above.  Patient has a history of a retinal artery occlusion in 2017.  He is on Eliquis and a statin he did have a reported previous cardiac work-up.  She has been having sensation of unsteadiness and falls over the last few years.  She has prior fall with pelvic fracture 2-1/2 years ago.  She had another fall in September where she fractured her ribs.  Since that time she  has had musculoskeletal chest pain on the left side worse with palpation.  Her EKG in the office today is normal she has had intermittent episodes of heart palpitations in the past   However none in the last few years.  She denies any associated dizziness.  She states when she first gets up or changes direction she will have the instability, she does have permanent vision loss.        Patient Active Problem List    Diagnosis Date Noted   • Anxiety    • Arthritis    • Cataracts, bilateral    • HL (hearing loss)    • Hypertension    • IBS (irritable bowel syndrome)    • Migraine    • Combined forms of age-related cataract of both eyes 07/20/2021   • CRAO (central retinal artery occlusion), right 07/20/2021   • Hyperopia of both eyes 07/20/2021   • Open angle with borderline findings, high risk, bilateral 07/20/2021   • Presbyopia 07/20/2021   • Suspicious optic nerve cupping of both eyes 07/20/2021   • Fibromyalgia 07/16/2021   • Osteoporosis 07/16/2021       No Known Allergies    Social History     Socioeconomic History   • Marital status:    Tobacco Use   • Smoking status: Never Smoker   • Smokeless tobacco: Never Used   Vaping Use   • Vaping Use: Never used   Substance and Sexual Activity   • Alcohol use: Yes     Alcohol/week: 7.0 standard drinks     Types: 7 Standard drinks or equivalent per week   • Drug use: Never   • Sexual activity: Defer       Family History   Problem Relation Age of Onset   • Cancer Mother    • Osteoporosis Mother    • Stroke Mother    • Heart attack Father    • Migraines Son        Current Medications:    Current Outpatient Medications:   •  aspirin (aspirin) 81 MG EC tablet, Take 1 tablet by mouth Daily., Disp: , Rfl:   •  atorvastatin (LIPITOR) 10 MG tablet, Take 10 mg by mouth every night at bedtime., Disp: , Rfl:   •  buPROPion XL (WELLBUTRIN XL) 300 MG 24 hr tablet, bupropion HCl  mg 24 hr tablet, extended release, Disp: , Rfl:   •  busPIRone (BUSPAR) 10 MG tablet, Take 10  mg by mouth 3 (Three) Times a Day., Disp: , Rfl:   •  calcium carbonate-vitamin d 600-400 MG-UNIT per tablet, bid, Disp: , Rfl:   •  cholecalciferol (Cholecalciferol) 25 MCG (1000 UT) tablet, once daily, Disp: , Rfl:   •  denosumab (Prolia) 60 MG/ML solution prefilled syringe syringe, 1 mL., Disp: , Rfl:   •  diazePAM (Valium) 5 MG tablet, Take 1 tab 1 hours before procedure., Disp: 4 tablet, Rfl: 0  •  Diclofenac Sodium (VOLTAREN) 1 % gel gel, diclofenac 1 % topical gel, Disp: , Rfl:   •  DULoxetine (CYMBALTA) 30 MG capsule, Take 30 mg by mouth Daily., Disp: , Rfl:   •  Eliquis 5 MG tablet tablet, Take 5 mg by mouth 2 (Two) Times a Day., Disp: , Rfl:   •  escitalopram (LEXAPRO) 20 MG tablet, escitalopram 20 mg tablet, Disp: , Rfl:   •  folic acid (FOLVITE) 1 MG tablet, folic acid 1 mg tablet, Disp: , Rfl:   •  latanoprost (XALATAN) 0.005 % ophthalmic solution, latanoprost 0.005 % eye drops  INSTILL 1 DROP INTO AFFECTED EYE(S) BY OPHTHALMIC ROUTE ONCE DAILY INTHE EVENING, Disp: , Rfl:   •  metoprolol succinate XL (TOPROL-XL) 100 MG 24 hr tablet, metoprolol succinate  mg tablet,extended release 24 hr, Disp: , Rfl:   •  olmesartan (BENICAR) 40 MG tablet, olmesartan 40 mg tablet, Disp: , Rfl:   •  Omega-3 Fatty Acids (fish oil) 1000 MG capsule capsule, Take  by mouth Daily With Breakfast., Disp: , Rfl:   •  timolol (TIMOPTIC) 0.5 % ophthalmic solution, timolol 0.5 % eye drops  INSTILL 1 DROP INTO AFFECTED EYE(S) BY OPHTHALMIC ROUTE 2 TIMES PER DAY, Disp: , Rfl:   •  traMADol (ULTRAM) 50 MG tablet, tramadol 50 mg tablet, Disp: , Rfl:   •  zolpidem CR (AMBIEN CR) 12.5 MG CR tablet, zolpidem ER 12.5 mg tablet,extended release,multiphase, Disp: , Rfl:      Review of Systems   Cardiovascular: Positive for chest pain (Musculoskeletal from rib fractures on the left side) and palpitations. Negative for dyspnea on exertion, near-syncope and syncope.   Musculoskeletal: Positive for falls.   Neurological: Positive for  disturbances in coordination and loss of balance.       There were no vitals filed for this visit.    Physical Exam  Constitutional:       Appearance: Normal appearance.   Eyes:      Comments: Chronic right visual loss   Cardiovascular:      Rate and Rhythm: Normal rate and regular rhythm.      Pulses: Normal pulses.      Heart sounds: Normal heart sounds.      Comments: Tenderness to palpation left chest  Pulmonary:      Breath sounds: Normal breath sounds.   Neurological:      General: No focal deficit present.      Mental Status: She is alert.   Psychiatric:         Mood and Affect: Mood normal.         Diagnostic Data:    ECG 12 Lead    Date/Time: 10/26/2021 3:44 PM  Performed by: Reuben Hines MD  Authorized by: Reuben Hines MD   Comparison: not compared with previous ECG   Previous ECG: no previous ECG available  Rhythm: sinus rhythm  Rate: normal  BPM: 69  QRS axis: normal    Clinical impression: normal ECG          No results found for: CHLPL, TRIG, HDL, LDLDIRECT  Lab Results   Component Value Date    GLUCOSE 83 09/15/2021    BUN 13 09/15/2021    CREATININE 1.03 (H) 09/15/2021     09/15/2021    K 4.5 09/15/2021     09/15/2021    CO2 26.6 09/15/2021     No results found for: HGBA1C  Lab Results   Component Value Date    WBC 9.11 09/15/2021    HGB 14.5 09/15/2021    HCT 44.9 09/15/2021     09/15/2021       Assessment:  No diagnosis found.    Plan:      1.  Right retinal artery occlusion  -On aspirin, Eliquis, and a statin  -Recent echo showed normal LVEF, no hemodynamically significant carotid stenosis on duplex  -Due to palpitation history we will do a 2-week monitor to document for any possible atrial fibrillation, however this time would not significantly     2.  Hypertension  -Might have a component of orthostatic hypotension leading to her falls will reduce her Benicar to 20 mg daily.  She already takes it at night    3.  Hyperlipidemia  On  atorvastatin 10 mg    4.  Chest pain, left rib fractures  -MSK in nature and worsened with palpation, EKG normal today.  Does not appear consistent with ischemia    5.  Frequent falls and gait instability  -Extensive neurologic work-up ongoing, at this time does not appear to have a significant cardiac component    Follow-up in 3 months        Reuben Hines MD [facility]

## 2021-10-29 ENCOUNTER — LAB (OUTPATIENT)
Dept: PREADMISSION TESTING | Facility: HOSPITAL | Age: 76
End: 2021-10-29

## 2021-10-29 DIAGNOSIS — Z11.59 ENCOUNTER FOR SCREENING FOR VIRAL DISEASE: ICD-10-CM

## 2021-10-29 LAB
ACHR BIND AB SER-SCNC: <0.03 NMOL/L (ref 0–0.24)
ACHR BLOCK AB SER-ACNC: 13 % (ref 0–25)
ACHR MOD AB/ACHR TOTAL SFR SER: NORMAL %
REFLEX INFORMATION: NORMAL
SARS-COV-2 RNA PNL SPEC NAA+PROBE: NOT DETECTED
STRIA MUS AB TITR SER IF: NEGATIVE {TITER}

## 2021-10-29 PROCEDURE — U0005 INFEC AGEN DETEC AMPLI PROBE: HCPCS | Performed by: OPHTHALMOLOGY

## 2021-10-29 PROCEDURE — U0004 COV-19 TEST NON-CDC HGH THRU: HCPCS | Performed by: OPHTHALMOLOGY

## 2021-11-01 ENCOUNTER — PATIENT MESSAGE (OUTPATIENT)
Dept: NEUROLOGY | Facility: CLINIC | Age: 76
End: 2021-11-01

## 2021-11-18 ENCOUNTER — OFFICE VISIT (OUTPATIENT)
Dept: NEUROLOGY | Facility: CLINIC | Age: 76
End: 2021-11-18

## 2021-11-18 VITALS
SYSTOLIC BLOOD PRESSURE: 120 MMHG | OXYGEN SATURATION: 98 % | HEART RATE: 61 BPM | DIASTOLIC BLOOD PRESSURE: 68 MMHG | BODY MASS INDEX: 25.61 KG/M2 | HEIGHT: 64 IN | TEMPERATURE: 97.5 F | WEIGHT: 150 LBS

## 2021-11-18 DIAGNOSIS — R26.81 UNSTEADY GAIT: ICD-10-CM

## 2021-11-18 DIAGNOSIS — R26.89 BALANCE DISORDER: Primary | ICD-10-CM

## 2021-11-18 PROCEDURE — 99214 OFFICE O/P EST MOD 30 MIN: CPT | Performed by: PSYCHIATRY & NEUROLOGY

## 2021-11-18 RX ORDER — KETOROLAC TROMETHAMINE 4 MG/ML
SOLUTION/ DROPS OPHTHALMIC
COMMUNITY
Start: 2021-10-27

## 2021-11-18 RX ORDER — OFLOXACIN 3 MG/ML
SOLUTION/ DROPS OPHTHALMIC
COMMUNITY
Start: 2021-10-27

## 2021-11-18 RX ORDER — PREDNISOLONE ACETATE 10 MG/ML
SUSPENSION/ DROPS OPHTHALMIC
COMMUNITY
Start: 2021-10-27

## 2021-11-18 NOTE — PROGRESS NOTES
"Chief Complaint  Balance Issues    Subjective          Marilyn Bernal presents to [facility] NEUROLOGY     History of Present Illness    76 y.o. female returns in follow up.  Last visit on 9/15/21 ordered MRI Brain, labs, echo, holter, C U/S. referred to PT.    Labs reviewed are unremarkable    MRI Brain not done due to anxiety.    Unsteady when arising from sitting.  Taking Celexa, Lexapro, Cymbalta, tramadol.  N/T in feet.      CRAO Lupus anticoagulant.     Results for orders placed during the hospital encounter of 10/19/21    Adult Transthoracic Echo Complete W/ Cont if Necessary Per Protocol    Interpretation Summary  · Estimated right ventricular systolic pressure from tricuspid regurgitation is normal (<35 mmHg). Calculated right ventricular systolic pressure from tricuspid regurgitation is 31 mmHg.  · Estimated left ventricular EF = 65% Estimated left ventricular EF was in agreement with the calculated left ventricular EF. Left ventricular ejection fraction appears to be 61 - 65%. Left ventricular systolic function is normal.  · Left ventricular diastolic function is consistent with age.  · Normal right ventricular cavity size, wall thickness, systolic function and septal motion noted.  · No evidence of pulmonary hypertension is present.  · There is no evidence of pericardial effusion.  · The aortic valve exhibits mild sclerosis.  · Saline test for shunting not performed.   Results for orders placed during the hospital encounter of 10/19/21    Duplex Carotid Ultrasound CAR    Interpretation Summary  · Right internal carotid artery stenosis of 0-49%.  · Left internal carotid artery stenosis of 0-49%.  · Vertebral artery flow is antegrade bilaterally  · Elevated velocities in the right subclavian artery suggesting a mild, less than 50% stenosis.     Holter pending.   Objective   Vital Signs:   /68   Pulse 61   Temp 97.5 °F (36.4 °C)   Ht 162.6 cm (64.02\")   Wt 68 kg (150 lb)   " SpO2 98%   BMI 25.73 kg/m²     Physical Exam  Eyes:      Extraocular Movements: EOM normal.      Pupils: Pupils are equal, round, and reactive to light.   Neurological:      Mental Status: She is oriented to person, place, and time.      Deep Tendon Reflexes: Strength normal.   Psychiatric:         Speech: Speech normal.          Neurologic Exam     Mental Status   Oriented to person, place, and time.   Speech: speech is normal   Level of consciousness: alert  Knowledge: good and consistent with education.   Normal comprehension.     Cranial Nerves   Cranial nerves II through XII intact.     CN II   Visual fields full to confrontation.   Visual acuity: normal  Right visual field deficit: none  Left visual field deficit: none     CN III, IV, VI   Pupils are equal, round, and reactive to light.  Extraocular motions are normal.   Nystagmus: none   Diplopia: none  Ophthalmoparesis: none  Upgaze: normal  Downgaze: normal  Conjugate gaze: present    CN V   Facial sensation intact.   Right corneal reflex: normal  Left corneal reflex: normal    CN VII   Right facial weakness: none  Left facial weakness: none    CN VIII   Hearing: intact    CN IX, X   Palate: symmetric  Right gag reflex: normal  Left gag reflex: normal    CN XI   Right sternocleidomastoid strength: normal  Left sternocleidomastoid strength: normal    CN XII   Tongue: not atrophic  Fasciculations: absent  Tongue deviation: none    Motor Exam   Muscle bulk: normal  Overall muscle tone: normal    Strength   Strength 5/5 throughout.     Sensory Exam   Light touch normal.     Gait, Coordination, and Reflexes     Gait  Gait: wide-based    Tremor   Resting tremor: absent  Intention tremor: absent  Action tremor: absent    Reflexes   Reflexes 2+ except as noted.      Result Review :   The following data was reviewed by: Cash Leslie MD on 11/18/2021:  Common labs    Common Labsle 9/15/21 9/15/21 12/14/21    1139 1139    Glucose  83    BUN  13    Creatinine   1.03 (A) 1.00   eGFR Non African Am  52 (A)    Sodium  139    Potassium  4.5    Chloride  100    Calcium  9.9    Albumin  5.20    Total Bilirubin  0.4    Alkaline Phosphatase  77    AST (SGOT)  22    ALT (SGPT)  30    WBC 9.11     Hemoglobin 14.5     Hematocrit 44.9     Platelets 240     (A) Abnormal value       Comments are available for some flowsheets but are not being displayed.           Data reviewed: Cardiology studies Echo and Carotid          Assessment and Plan    Diagnoses and all orders for this visit:    1. Balance disorder (Primary)  -     CT Head Without Contrast; Future  -     CT Angiogram Neck With & Without Contrast; Future  -     CT Angiogram Head; Future  -     Ambulatory Referral to Physical Therapy Evaluate and treat    2. Unsteady gait  Assessment & Plan:  Stop Celexa, Cymbalta, Tramadol    CTA H & N, HCT    Orders:  -     Ambulatory Referral to Physical Therapy Evaluate and treat      Follow Up   No follow-ups on file.  Patient was given instructions and counseling regarding her condition or for health maintenance advice. Please see specific information pulled into the AVS if appropriate.

## 2021-12-07 ENCOUNTER — TELEPHONE (OUTPATIENT)
Dept: CARDIOLOGY | Facility: CLINIC | Age: 76
End: 2021-12-07

## 2021-12-07 NOTE — TELEPHONE ENCOUNTER
----- Message from Reuben Hines MD sent at 11/29/2021  2:13 PM EST -----  Patient's Holter monitor did not show episodes of atrial fibrillation.  However with history of retinal artery occlusion would recommend she continue on her Eliquis at this time.  No other significant abnormal rhythms on this monitor.

## 2021-12-14 ENCOUNTER — HOSPITAL ENCOUNTER (OUTPATIENT)
Dept: CT IMAGING | Facility: HOSPITAL | Age: 76
Discharge: HOME | End: 2021-12-14
Admitting: PSYCHIATRY & NEUROLOGY

## 2021-12-14 DIAGNOSIS — R26.89 BALANCE DISORDER: ICD-10-CM

## 2021-12-14 LAB — CREAT BLDA-MCNC: 1 MG/DL (ref 0.6–1.3)

## 2021-12-14 PROCEDURE — 70450 CT HEAD/BRAIN W/O DYE: CPT

## 2021-12-14 PROCEDURE — 70498 CT ANGIOGRAPHY NECK: CPT

## 2021-12-14 PROCEDURE — 82565 ASSAY OF CREATININE: CPT

## 2021-12-14 PROCEDURE — 70496 CT ANGIOGRAPHY HEAD: CPT

## 2021-12-14 PROCEDURE — 0 IOPAMIDOL PER 1 ML: Performed by: PSYCHIATRY & NEUROLOGY

## 2021-12-14 RX ADMIN — IOPAMIDOL 100 ML: 755 INJECTION, SOLUTION INTRAVENOUS at 09:41

## 2021-12-15 ENCOUNTER — TELEPHONE (OUTPATIENT)
Dept: NEUROLOGY | Facility: CLINIC | Age: 76
End: 2021-12-15

## 2021-12-15 NOTE — TELEPHONE ENCOUNTER
----- Message from Cash Leslie MD sent at 12/15/2021  9:27 AM EST -----  Justin torres reviewed CT and age related changes are seen,  continue current medications

## 2022-02-22 ENCOUNTER — OFFICE VISIT (OUTPATIENT)
Dept: NEUROLOGY | Facility: CLINIC | Age: 77
End: 2022-02-22

## 2022-02-22 VITALS
WEIGHT: 150 LBS | BODY MASS INDEX: 25.61 KG/M2 | OXYGEN SATURATION: 98 % | HEART RATE: 60 BPM | SYSTOLIC BLOOD PRESSURE: 116 MMHG | HEIGHT: 64 IN | DIASTOLIC BLOOD PRESSURE: 66 MMHG

## 2022-02-22 DIAGNOSIS — R26.89 BALANCE DISORDER: Primary | Chronic | ICD-10-CM

## 2022-02-22 DIAGNOSIS — H34.11 CRAO (CENTRAL RETINAL ARTERY OCCLUSION), RIGHT: Chronic | ICD-10-CM

## 2022-02-22 PROCEDURE — 99213 OFFICE O/P EST LOW 20 MIN: CPT | Performed by: PSYCHIATRY & NEUROLOGY

## 2022-02-22 RX ORDER — ROSUVASTATIN CALCIUM 10 MG/1
10 TABLET, COATED ORAL DAILY
COMMUNITY
Start: 2022-01-30

## 2022-02-22 RX ORDER — TRAMADOL HYDROCHLORIDE 50 MG/1
50 TABLET ORAL EVERY 6 HOURS PRN
COMMUNITY
Start: 2022-01-19

## 2022-02-22 NOTE — PROGRESS NOTES
Chief Complaint  Balance Issues    Subjective          Marilyn Bernal presents to [facility] NEUROLOGY     History of Present Illness    76 y.o. female returns in follow up.  Last visit on 11/18/21 ordered HCT/ CTA H & N, stopped Celexa, Cymbalta, Tramadol.      HCT/CTA H & N, my review of films, 50% R ICA, 20% L ICA, atrophy, focally worse left central sulcus.     Able to stop Celexa, Cymbalta,     Holter no evidence of AF    No further falls since last visit.      CRAO Lupus anticoagulant.      Results for orders placed during the hospital encounter of 10/19/21     Adult Transthoracic Echo Complete W/ Cont if Necessary Per Protocol     Interpretation Summary  · Estimated right ventricular systolic pressure from tricuspid regurgitation is normal (<35 mmHg). Calculated right ventricular systolic pressure from tricuspid regurgitation is 31 mmHg.  · Estimated left ventricular EF = 65% Estimated left ventricular EF was in agreement with the calculated left ventricular EF. Left ventricular ejection fraction appears to be 61 - 65%. Left ventricular systolic function is normal.  · Left ventricular diastolic function is consistent with age.  · Normal right ventricular cavity size, wall thickness, systolic function and septal motion noted.  · No evidence of pulmonary hypertension is present.  · There is no evidence of pericardial effusion.  · The aortic valve exhibits mild sclerosis.  · Saline test for shunting not performed.   Results for orders placed during the hospital encounter of 10/19/21     Duplex Carotid Ultrasound CAR     Interpretation Summary  · Right internal carotid artery stenosis of 0-49%.  · Left internal carotid artery stenosis of 0-49%.  · Vertebral artery flow is antegrade bilaterally  · Elevated velocities in the right subclavian artery suggesting a mild, less than 50% stenosis.     Holter - benign monitor study.      Objective   Vital Signs:   /66   Pulse 60   Ht 162.6 cm  "(64.02\")   Wt 68 kg (150 lb)   SpO2 98%   BMI 25.73 kg/m²     Physical Exam  Eyes:      Extraocular Movements: EOM normal.   Neurological:      Gait: Gait is intact.      Deep Tendon Reflexes: Strength normal.          Neurologic Exam     Cranial Nerves     CN III, IV, VI   Extraocular motions are normal.     CN VII   Facial expression full, symmetric.   Right esotropia     Motor Exam     Strength   Strength 5/5 throughout.     Gait, Coordination, and Reflexes     Gait  Gait: normal     Result Review :   The following data was reviewed by: Cash Leslie MD on 02/22/2022:  Common labs    Common Labsle 9/15/21 9/15/21 12/14/21    1139 1139    Glucose  83    BUN  13    Creatinine  1.03 (A) 1.00   eGFR Non African Am  52 (A)    Sodium  139    Potassium  4.5    Chloride  100    Calcium  9.9    Albumin  5.20    Total Bilirubin  0.4    Alkaline Phosphatase  77    AST (SGOT)  22    ALT (SGPT)  30    WBC 9.11     Hemoglobin 14.5     Hematocrit 44.9     Platelets 240     (A) Abnormal value       Comments are available for some flowsheets but are not being displayed.           Data reviewed: Radiologic studies HCT/CTA H&N, Holter           Assessment and Plan    Diagnoses and all orders for this visit:    1. Balance disorder (Primary)  Assessment & Plan:  No further falls since last visit.        2. CRAO (central retinal artery occlusion), right  Assessment & Plan:  Continue ASA/Eliquis/statin          Follow Up   No follow-ups on file.  Patient was given instructions and counseling regarding her condition or for health maintenance advice. Please see specific information pulled into the AVS if appropriate.       "

## 2022-03-14 NOTE — PROGRESS NOTES
[facility] Cardiology  Follow Up Visit  Marilyn Bernal  1945    VISIT DATE:  03/15/22    PCP:   Colleen Reddy MD  [address] [address]          CC:  Hypertension, unspecified type      Problem List:  1.  Retinal artery occlusion  2.  Hypertension  3.  Hyperlipidemia  4.  Fall with prior pelvic and rib fractures     Cardiac testing:      Echo October 2021  · Estimated right ventricular systolic pressure from tricuspid regurgitation is normal (<35     mmHg). Calculated right ventricular systolic pressure from tricuspid regurgitation is 31      mmHg.  · Estimated left ventricular EF = 65% Estimated left ventricular EF was in agreement          with the calculated left ventricular EF. Left ventricular ejection fraction appears to be 61          - 65%. Left ventricular systolic function is normal.  · Left ventricular diastolic function is consistent with age.  · Normal right ventricular cavity size, wall thickness, systolic function and septal motion      noted.  · No evidence of pulmonary hypertension is present.  · There is no evidence of pericardial effusion.  · The aortic valve exhibits mild sclerosis.  · Saline test for shunting not performed.        Carotid duplex October 2021  · Right internal carotid artery stenosis of 0-49%.  · Left internal carotid artery stenosis of 0-49%.  · Vertebral artery flow is antegrade bilaterally  · Elevated velocities in the right subclavian artery suggesting a mild, less than 50% stenosis.     Holter October 2021  · A relatively benign monitor study.  · Short episodes atrial tachycardia. No episodes of atrial fibrillation. Longest atrial tach episode was 10 beats.  · No other arrhythmias noted          History of Present Illness:  Marilyn Bernal  Is a 76 y.o. female with pertinent cardiac history detailed above.  Recent work-up has not shown an etiology for her prior retinal artery occlusion.  However she is maintained on Eliquis  which is prevented any other embolic or neurologic insults over the last 5 years.  She denies any tachypalpitations.  She does think to her dizziness upon standing is better on the reduced dose of Benicar and actually with assistance of her  who is a pharmacist her metoprolol dose has been reduced decreased to 50 mg XL daily due to concern for orthostasis.  I think we could do  decrease this further down to 25 mg and suggested that change today.  She does have issues with loss of balance when turning but I think this is due to the decreased visual feedback from her prior retinal artery occlusion.      Patient Active Problem List    Diagnosis Date Noted   • Balance disorder 11/18/2021   • Unsteady gait 11/18/2021   • Anxiety    • Arthritis    • Cataracts, bilateral    • HL (hearing loss)    • Hypertension    • IBS (irritable bowel syndrome)    • Migraine    • Combined forms of age-related cataract of both eyes 07/20/2021   • CRAO (central retinal artery occlusion), right 07/20/2021   • Hyperopia of both eyes 07/20/2021   • Open angle with borderline findings, high risk, bilateral 07/20/2021   • Presbyopia 07/20/2021   • Suspicious optic nerve cupping of both eyes 07/20/2021   • Fibromyalgia 07/16/2021   • Osteoporosis 07/16/2021       No Known Allergies    Social History     Socioeconomic History   • Marital status:    Tobacco Use   • Smoking status: Never Smoker   • Smokeless tobacco: Never Used   Vaping Use   • Vaping Use: Never used   Substance and Sexual Activity   • Alcohol use: Yes     Alcohol/week: 10.0 standard drinks     Types: 5 Glasses of wine, 5 Standard drinks or equivalent per week   • Drug use: Never   • Sexual activity: Not Currently     Partners: Male       Family History   Problem Relation Age of Onset   • Cancer Mother    • Osteoporosis Mother    • Stroke Mother    • Heart attack Father    • Asthma Sister    • Migraines Son        Current Medications:    Current Outpatient  Medications:   •  aspirin 81 MG EC tablet, Take 1 tablet by mouth Daily., Disp: , Rfl:   •  buPROPion XL (WELLBUTRIN XL) 300 MG 24 hr tablet, Take 300 mg by mouth Every Morning., Disp: , Rfl:   •  busPIRone (BUSPAR) 10 MG tablet, Take 10 mg by mouth 3 (Three) Times a Day., Disp: , Rfl:   •  calcium carbonate-vitamin d 600-400 MG-UNIT per tablet, bid, Disp: , Rfl:   •  cholecalciferol (VITAMIN D3) 25 MCG (1000 UT) tablet, once daily, Disp: , Rfl:   •  denosumab (Prolia) 60 MG/ML solution prefilled syringe syringe, Inject 1 mL under the skin into the appropriate area as directed Every 6 (Six) Months., Disp: , Rfl:   •  Diclofenac Sodium (VOLTAREN) 1 % gel gel, As Needed., Disp: , Rfl:   •  Eliquis 5 MG tablet tablet, Take 5 mg by mouth 2 (Two) Times a Day., Disp: , Rfl:   •  folic acid (FOLVITE) 1 MG tablet, Take 1 mg by mouth Daily., Disp: , Rfl:   •  metoprolol succinate XL (TOPROL-XL) 100 MG 24 hr tablet, Take 100 mg by mouth Daily., Disp: , Rfl:   •  olmesartan (BENICAR) 20 MG tablet, Take 1 tablet by mouth Daily., Disp: 30 tablet, Rfl: 6  •  Omega-3 Fatty Acids (fish oil) 1000 MG capsule capsule, Take  by mouth Daily With Breakfast., Disp: , Rfl:   •  rosuvastatin (CRESTOR) 10 MG tablet, Take 10 mg by mouth Daily., Disp: , Rfl:   •  timolol (TIMOPTIC) 0.5 % ophthalmic solution, Administer 1 drop to both eyes Daily., Disp: , Rfl:   •  traMADol (ULTRAM) 50 MG tablet, Take 50 mg by mouth Every 6 (Six) Hours As Needed., Disp: , Rfl:   •  zolpidem CR (AMBIEN CR) 12.5 MG CR tablet, Take 12.5 mg by mouth Every Night., Disp: , Rfl:   •  escitalopram (LEXAPRO) 20 MG tablet, Take 20 mg by mouth Every Night., Disp: , Rfl:   •  ketorolac (ACULAR) 0.4 % solution, , Disp: , Rfl:   •  latanoprost (XALATAN) 0.005 % ophthalmic solution, latanoprost 0.005 % eye drops  INSTILL 1 DROP INTO AFFECTED EYE(S) BY OPHTHALMIC ROUTE ONCE DAILY INTHE EVENING, Disp: , Rfl:   •  ofloxacin (OCUFLOX) 0.3 % ophthalmic solution, , Disp: , Rfl:   •  " prednisoLONE acetate (PRED FORTE) 1 % ophthalmic suspension, , Disp: , Rfl:      Review of Systems   Cardiovascular: Negative for chest pain, dyspnea on exertion, irregular heartbeat and palpitations.   Neurological: Positive for disturbances in coordination (Returning) and dizziness.       Vitals:    03/15/22 1520   BP: 126/80   BP Location: Right arm   Patient Position: Sitting   Pulse: 68   SpO2: 96%   Weight: 69.5 kg (153 lb 3.2 oz)   Height: 162.6 cm (64\")       Physical Exam  Constitutional:       Appearance: Normal appearance.   Cardiovascular:      Rate and Rhythm: Normal rate and regular rhythm.      Pulses: Normal pulses.      Heart sounds: Normal heart sounds.   Pulmonary:      Effort: Pulmonary effort is normal.      Breath sounds: Normal breath sounds.   Abdominal:      Palpations: Abdomen is soft.   Musculoskeletal:      Right lower leg: No edema.      Left lower leg: No edema.   Neurological:      General: No focal deficit present.      Mental Status: She is alert.         Diagnostic Data:  Procedures  COMPLETE BLOOD COUNT 02/22/2022   WBC 9.8   Red Blood Cell Count 4.41   Hemoglobin 13.9   Hematocrit 42.1   Platelets 202     COMPREHENSIVE METABOLIC PANEL 02/22/2022   Glucose 89   Blood Urea Nitrogen 15   Creatinine 0.91   BUN/Creatinine Ratio Not Listed   Sodium 140   Potassium 5.1   Chloride 102   Carbon Dioxide 29   Calcium 10.7 H   Total Protein 7.1   Albumin 4.8   ALT 44   AST 36   Alkaline Phosphatase 67   Total Bilirubin 0.4   eGFR Non African American >60     LIPID PANEL 02/22/2022   Total Cholesterol 182   Triglycerides 112   HDL 87.8 H   LDL Cholesterol 72         Assessment:  No diagnosis found.    Plan:      1.  Right retinal artery occlusion  -On aspirin, Eliquis, and a statin  -echo showed normal LVEF, no hemodynamically significant carotid stenosis on duplex  -Holter showed short episodes of atrial tachycardia longest 10 beats, no A. Fib  -At this time would not recommend further " cardiac work-up for emboli as it would be unlikely to .  Continue current management strategy.     2.  Hypertension  -Benicar 20 mg  -Reduce Toprol-XL to 25 mg daily  -Controlled      3.  Hyperlipidemia  On atorvastatin 10 mg     4.  Chest pain, prior left rib fractures  -MSK in nature and worsened with palpation     5.  Frequent falls and gait instability  -Extensive neurologic work-up ongoing, at this time does not appear to have a significant cardiac component  -Did decrease her BP meds as detailed above to help minimize risk of orthostasis    At this time will be happy to see the patient with any new cardiac concerns but I think she can follow-up with her PCP and neurology and return to us as needed.             Reuben Hines MD [facility]

## 2022-03-15 ENCOUNTER — OFFICE VISIT (OUTPATIENT)
Dept: CARDIOLOGY | Facility: CLINIC | Age: 77
End: 2022-03-15

## 2022-03-15 VITALS
HEART RATE: 68 BPM | DIASTOLIC BLOOD PRESSURE: 80 MMHG | WEIGHT: 153.2 LBS | SYSTOLIC BLOOD PRESSURE: 126 MMHG | HEIGHT: 64 IN | OXYGEN SATURATION: 96 % | BODY MASS INDEX: 26.15 KG/M2

## 2022-03-15 DIAGNOSIS — I10 HYPERTENSION, UNSPECIFIED TYPE: Primary | ICD-10-CM

## 2022-03-15 PROCEDURE — 99214 OFFICE O/P EST MOD 30 MIN: CPT | Performed by: INTERNAL MEDICINE

## 2022-03-15 RX ORDER — METOPROLOL SUCCINATE 25 MG/1
25 TABLET, EXTENDED RELEASE ORAL DAILY
Qty: 30 TABLET | Refills: 6 | Status: SHIPPED | OUTPATIENT
Start: 2022-03-15

## 2022-05-16 RX ORDER — OLMESARTAN MEDOXOMIL 20 MG/1
20 TABLET ORAL DAILY
Qty: 90 TABLET | Refills: 0 | Status: SHIPPED | OUTPATIENT
Start: 2022-05-16

## 2022-08-04 ENCOUNTER — APPOINTMENT (OUTPATIENT)
Dept: CT IMAGING | Facility: HOSPITAL | Age: 77
End: 2022-08-04

## 2022-08-04 ENCOUNTER — HOSPITAL ENCOUNTER (EMERGENCY)
Facility: HOSPITAL | Age: 77
Discharge: HOME | End: 2022-08-05
Attending: EMERGENCY MEDICINE | Admitting: EMERGENCY MEDICINE

## 2022-08-04 DIAGNOSIS — R26.81 GAIT INSTABILITY: ICD-10-CM

## 2022-08-04 DIAGNOSIS — S00.83XA FACIAL HEMATOMA, INITIAL ENCOUNTER: Primary | ICD-10-CM

## 2022-08-04 DIAGNOSIS — T45.515A ANTICOAGULANT CAUSING ADVERSE EFFECT IN THERAPEUTIC USE, INITIAL ENCOUNTER: ICD-10-CM

## 2022-08-04 DIAGNOSIS — Z86.69: ICD-10-CM

## 2022-08-04 PROCEDURE — 70450 CT HEAD/BRAIN W/O DYE: CPT

## 2022-08-04 PROCEDURE — 70486 CT MAXILLOFACIAL W/O DYE: CPT

## 2022-08-04 PROCEDURE — 99283 EMERGENCY DEPT VISIT LOW MDM: CPT

## 2022-08-04 PROCEDURE — 36415 COLL VENOUS BLD VENIPUNCTURE: CPT

## 2022-08-04 RX ORDER — ONDANSETRON 2 MG/ML
4 INJECTION INTRAMUSCULAR; INTRAVENOUS ONCE
Status: DISCONTINUED | OUTPATIENT
Start: 2022-08-04 | End: 2022-08-05

## 2022-08-04 RX ORDER — ACETAMINOPHEN 500 MG
1000 TABLET ORAL ONCE
Status: DISCONTINUED | OUTPATIENT
Start: 2022-08-04 | End: 2022-08-05 | Stop reason: HOSPADM

## 2022-08-04 RX ORDER — SODIUM CHLORIDE 0.9 % (FLUSH) 0.9 %
10 SYRINGE (ML) INJECTION AS NEEDED
Status: DISCONTINUED | OUTPATIENT
Start: 2022-08-04 | End: 2022-08-05 | Stop reason: HOSPADM

## 2022-08-04 RX ORDER — MORPHINE SULFATE 2 MG/ML
2 INJECTION, SOLUTION INTRAMUSCULAR; INTRAVENOUS ONCE
Status: DISCONTINUED | OUTPATIENT
Start: 2022-08-04 | End: 2022-08-05

## 2022-08-05 VITALS
BODY MASS INDEX: 26.46 KG/M2 | SYSTOLIC BLOOD PRESSURE: 157 MMHG | DIASTOLIC BLOOD PRESSURE: 88 MMHG | OXYGEN SATURATION: 97 % | HEART RATE: 70 BPM | WEIGHT: 155 LBS | RESPIRATION RATE: 18 BRPM | TEMPERATURE: 99 F | HEIGHT: 64 IN

## 2022-08-05 LAB
ALBUMIN SERPL-MCNC: 4.3 G/DL (ref 3.5–5.2)
ALBUMIN/GLOB SERPL: 1.6 G/DL
ALP SERPL-CCNC: 74 U/L (ref 39–117)
ALT SERPL W P-5'-P-CCNC: 34 U/L (ref 1–33)
ANION GAP SERPL CALCULATED.3IONS-SCNC: 12 MMOL/L (ref 5–15)
AST SERPL-CCNC: 42 U/L (ref 1–32)
BILIRUB SERPL-MCNC: 0.2 MG/DL (ref 0–1.2)
BUN SERPL-MCNC: 13 MG/DL (ref 8–23)
BUN/CREAT SERPL: 16.9 (ref 7–25)
CALCIUM SPEC-SCNC: 9.1 MG/DL (ref 8.6–10.5)
CHLORIDE SERPL-SCNC: 105 MMOL/L (ref 98–107)
CO2 SERPL-SCNC: 24 MMOL/L (ref 22–29)
CREAT SERPL-MCNC: 0.77 MG/DL (ref 0.57–1)
EGFRCR SERPLBLD CKD-EPI 2021: 80.6 ML/MIN/1.73
GLOBULIN UR ELPH-MCNC: 2.7 GM/DL
GLUCOSE SERPL-MCNC: 106 MG/DL (ref 65–99)
POTASSIUM SERPL-SCNC: 4.7 MMOL/L (ref 3.5–5.2)
PROT SERPL-MCNC: 7 G/DL (ref 6–8.5)
SODIUM SERPL-SCNC: 141 MMOL/L (ref 136–145)

## 2022-08-05 PROCEDURE — 36415 COLL VENOUS BLD VENIPUNCTURE: CPT

## 2022-08-05 PROCEDURE — 80053 COMPREHEN METABOLIC PANEL: CPT | Performed by: EMERGENCY MEDICINE

## 2022-08-05 RX ORDER — HYDROCODONE BITARTRATE AND ACETAMINOPHEN 5; 325 MG/1; MG/1
1-2 TABLET ORAL EVERY 12 HOURS PRN
Qty: 6 TABLET | Refills: 0 | Status: SHIPPED | OUTPATIENT
Start: 2022-08-05 | End: 2022-08-08

## 2022-08-05 RX ORDER — HYDROCODONE BITARTRATE AND ACETAMINOPHEN 5; 325 MG/1; MG/1
1 TABLET ORAL ONCE
Status: COMPLETED | OUTPATIENT
Start: 2022-08-05 | End: 2022-08-05

## 2022-08-05 RX ADMIN — HYDROCODONE BITARTRATE AND ACETAMINOPHEN 1 TABLET: 5; 325 TABLET ORAL at 00:42

## 2022-08-05 NOTE — DISCHARGE INSTRUCTIONS
Continue your current medications, I am concerned about increased risk of fall with prescription pain medication, therefore recommend acetaminophen for pain control and ice.  After the hematoma is resolving, for example in 2 to 3 days I recommend reexamination with your eye professional as discussed.  Return if any new or worsening problems or difficulty in self-care.

## 2022-08-05 NOTE — ED PROVIDER NOTES
EMERGENCY DEPARTMENT ENCOUNTER    Pt Name: Marilyn Bernal  MRN: [id number]  Pt :   1945  Room Number:    Date of encounter:  2022  PCP: Colleen Rdedy MD  ED Provider: Yury Harry MD    Historian: Patient      HPI:  Chief Complaint: Facial injury        Context: Marilyn Bernal is a 76 y.o. female who presents to the ED c/o facial injury, on the left side, after a trip and fall at home.  She does have a history of gait instability and some falls, since she has had a central retinal artery occlusion in the past resulting in these issues.  She fell today against a table at home, she has had pain on the right side of her face, and the left side, with increased swelling over 4 to 5 hours.  She does take Eliquis, which was prescribed after the CRAO.      PAST MEDICAL HISTORY  Past Medical History:   Diagnosis Date   • Abnormal gait     Reported by patient-slower, smaller steps   • Anxiety    • Arrhythmia    • Arthritis    • Cataracts, bilateral    • Deep vein thrombosis (HCC)    • Fibromyalgia, primary    • Fracture    • HL (hearing loss)    • Hyperlipidemia    • Hypertension    • IBS (irritable bowel syndrome)    • Migraine    • Stroke (HCC)          PAST SURGICAL HISTORY  Past Surgical History:   Procedure Laterality Date   • LIPOMA EXCISION      [facility]   • MIDDLE EAR SURGERY      [place], [place]         FAMILY HISTORY  Family History   Problem Relation Age of Onset   • Cancer Mother    • Osteoporosis Mother    • Stroke Mother    • Heart attack Father    • Asthma Sister    • Migraines Son          SOCIAL HISTORY  Social History     Socioeconomic History   • Marital status:    Tobacco Use   • Smoking status: Never Smoker   • Smokeless tobacco: Never Used   Vaping Use   • Vaping Use: Never used   Substance and Sexual Activity   • Alcohol use: Yes     Alcohol/week: 10.0 standard drinks     Types: 5 Glasses of wine, 5 Standard drinks or equivalent  per week   • Drug use: Never   • Sexual activity: Not Currently     Partners: Male         ALLERGIES  Patient has no known allergies.        REVIEW OF SYSTEMS  Review of Systems     General no recent illness no fevers or chills.  All systems reviewed and negative except for those discussed in HPI.       PHYSICAL EXAM    I have reviewed the triage vital signs and nursing notes.    ED Triage Vitals [08/04/22 2229]   Temp Heart Rate Resp BP SpO2   99 °F (37.2 °C) 76 20 140/85 95 %      Temp src Heart Rate Source Patient Position BP Location FiO2 (%)   Oral Monitor Sitting Right arm --       Physical Exam  GENERAL:   Appears large hematoma on the left side of the face.  Otherwise alert, interactive, no acute distress  HENT: Nares patent.  EYES: No scleral icterus.  Left eye, with periorbital hematoma, I do not see any pupillary abnormality, or hyphema.  CV: Regular rhythm, regular rate.  RESPIRATORY: Normal effort.  No audible wheezes, rales or rhonchi.  ABDOMEN: Soft, nontender  MUSCULOSKELETAL: No deformities.   NEURO: Alert, moves all extremities, follows commands.  SKIN: Warm, dry, no rash visualized.        LAB RESULTS  Recent Results (from the past 24 hour(s))   Comprehensive Metabolic Panel    Collection Time: 08/05/22 12:24 AM    Specimen: Blood   Result Value Ref Range    Glucose 106 (H) 65 - 99 mg/dL    BUN 13 8 - 23 mg/dL    Creatinine 0.77 0.57 - 1.00 mg/dL    Sodium 141 136 - 145 mmol/L    Potassium 4.7 3.5 - 5.2 mmol/L    Chloride 105 98 - 107 mmol/L    CO2 24.0 22.0 - 29.0 mmol/L    Calcium 9.1 8.6 - 10.5 mg/dL    Total Protein 7.0 6.0 - 8.5 g/dL    Albumin 4.30 3.50 - 5.20 g/dL    ALT (SGPT) 34 (H) 1 - 33 U/L    AST (SGOT) 42 (H) 1 - 32 U/L    Alkaline Phosphatase 74 39 - 117 U/L    Total Bilirubin 0.2 0.0 - 1.2 mg/dL    Globulin 2.7 gm/dL    A/G Ratio 1.6 g/dL    BUN/Creatinine Ratio 16.9 7.0 - 25.0    Anion Gap 12.0 5.0 - 15.0 mmol/L    eGFR 80.6 >60.0 mL/min/1.73       If labs were ordered, I  independently reviewed the results.        RADIOLOGY  CT Head Without Contrast    Result Date: 8/4/2022   DATE OF EXAM: 8/4/2022 10:43 PM  PROCEDURE: CT HEAD WO CONTRAST-  INDICATIONS: FELL, STRUCK FACE, LARGE AMOUNTS OF SWELLING AND BRUSING, ON BLOOD THINNERS  COMPARISON: No Comparisons Available  TECHNIQUE: Routine transaxial cuts were obtained through the head without the administration of contrast. Automated exposure control and iterative reconstruction methods were used.  FINDINGS: There is no intracranial hemorrhage. Negative for mass effect or midline shift. There is prominence of the ventricles and cortical sulci compatible generalized cerebral volume loss. Moderate areas of white matter hypodensity suggesting changes of chronic microvascular disease. Prominent extra-axial fluid collection abutting the high left frontal lobe on the left measuring 2.8 x 2.9 cm on image 43 and may relate to underlying arachnoid cyst. There is a large left periorbital contusion/hematoma. No retro-orbital hemorrhage. The mastoid air cells are well-aerated. There is near complete sinus opacification of the right maxillary sinus partially imaged. Facial bones better assessed with dedicated facial bone CT.      1. No intracranial hemorrhage. 2. Large left periorbital contusion/hematoma. 3. Generalized cerebral atrophy with moderate white matter findings of chronic microvascular disease.  This report was finalized on 8/4/2022 11:32 PM by Domingo Rodriguez MD.      CT Facial Bones Without Contrast    Result Date: 8/4/2022   DATE OF EXAM: 8/4/2022 10:43 PM  PROCEDURE: CT FACIAL BONES WO CONTRAST-  INDICATIONS: Facial trauma  COMPARISON: CTA head and neck 12/14/2021  TECHNIQUE: Routine axial images were obtained through facial bones without the administration of intravenous contrast. Reconstructed coronal and sagittal images were also obtained. Automated exposure control and iterative reconstruction methods were used.  FINDINGS: There is a  large left periorbital and infraorbital contusion/hematoma. The globes are intact. There is no retro-orbital fracture. The zygomatic arches are intact. The nasal bones are intact. The nasal septum is intact. Pterygoid plates intact. Mandible intact. Temporomandibular joints are maintained in alignment. The mastoid air cells are well-aerated.  There is complete sinus opacification of the right maxillary sinus with only a small amount of air seen centrally within the sinus. Mild ethmoid sinus thickening on the right.  The included portions of the upper cervical spine are intact with multilevel disc disease and facet arthritis noted. The dens is intact. Occipital condyles are intact. Lateral masses of C1 are normally aligned on C2. Hematoma within the left infraorbital soft tissues measures up to 3.6 x 2.8 cm on image 40. Carotid atherosclerotic calcifications noted bilaterally.      1. Negative for facial bone fracture. 2. Large contusion and hematoma involving the left periorbital and left infraorbital soft tissues. 3. Chronic complete sinus opacification of the right maxillary sinus.  This report was finalized on 8/4/2022 11:40 PM by Domingo Rodriguez MD.          Medications   HYDROcodone-acetaminophen (NORCO) 5-325 MG per tablet 1 tablet (1 tablet Oral Given 8/5/22 0042)         PROGRESS, DATA ANALYSIS, CONSULTS, AND MEDICAL DECISION MAKING  Mrs Bernal presents with a large facial hematoma after a fall.  She has some pain, and request medication for this as otc have not helped.  She notes a h/o of prior disease on some disabilities.  There is no associated fracture, however a large hematoma is present. I do not see any evidence, with the clinical information and examination although limited, of any left ocular injury, or an indication for acute surgical intervention or unstable condition.  Did discuss the hematoma and specifically anticipated difficulty dealing with this and need for follow-up care, also discussed  possibility of further consideration for inpatient or home OT or nursing care.  She and her  both state that they would be comfortable with self care at home, and I did discuss that she may have to call her primary care physician for assistance with home health or occupational therapy if needed.    AS OF 20:52 EDT VITALS:    BP - 157/88  HR - 70  TEMP - 99 °F (37.2 °C) (Oral)  O2 SATS - 97%      JAYLA reviewed by Yury Harry MD           DIAGNOSIS  Final diagnoses:   Facial hematoma, initial encounter   Anticoagulant causing adverse effect in therapeutic use, initial encounter   Gait instability   H/O central retinal artery occlusion         DISPOSITION  home           Yury Harry MD  08/05/22 2052

## 2022-11-15 ENCOUNTER — TRANSCRIBE ORDERS (OUTPATIENT)
Dept: ADMINISTRATIVE | Facility: HOSPITAL | Age: 77
End: 2022-11-15

## 2022-11-15 ENCOUNTER — HOSPITAL ENCOUNTER (OUTPATIENT)
Dept: GENERAL RADIOLOGY | Facility: HOSPITAL | Age: 77
Discharge: HOME | End: 2022-11-15
Admitting: INTERNAL MEDICINE

## 2022-11-15 DIAGNOSIS — M54.50 LOW BACK PAIN, UNSPECIFIED BACK PAIN LATERALITY, UNSPECIFIED CHRONICITY, UNSPECIFIED WHETHER SCIATICA PRESENT: Primary | ICD-10-CM

## 2022-11-15 PROCEDURE — 72100 X-RAY EXAM L-S SPINE 2/3 VWS: CPT

## 2023-02-24 ENCOUNTER — HOSPITAL ENCOUNTER (OUTPATIENT)
Dept: GENERAL RADIOLOGY | Facility: HOSPITAL | Age: 78
Discharge: HOME | End: 2023-02-24
Admitting: FAMILY MEDICINE
Payer: MEDICARE

## 2023-02-24 ENCOUNTER — TRANSCRIBE ORDERS (OUTPATIENT)
Dept: ADMINISTRATIVE | Facility: HOSPITAL | Age: 78
End: 2023-02-24
Payer: MEDICARE

## 2023-02-24 DIAGNOSIS — M25.562 LEFT MEDIAL KNEE PAIN: ICD-10-CM

## 2023-02-24 DIAGNOSIS — M25.562 LEFT MEDIAL KNEE PAIN: Primary | ICD-10-CM

## 2023-02-24 PROCEDURE — 73562 X-RAY EXAM OF KNEE 3: CPT

## 2023-11-15 ENCOUNTER — TRANSCRIBE ORDERS (OUTPATIENT)
Dept: ADMINISTRATIVE | Facility: HOSPITAL | Age: 78
End: 2023-11-15
Payer: MEDICARE

## 2023-11-15 ENCOUNTER — HOSPITAL ENCOUNTER (OUTPATIENT)
Dept: GENERAL RADIOLOGY | Facility: HOSPITAL | Age: 78
Discharge: HOME | End: 2023-11-15
Payer: MEDICARE

## 2023-11-15 DIAGNOSIS — S99.921A INJURY OF RIGHT FOOT, INITIAL ENCOUNTER: Primary | ICD-10-CM

## 2023-11-15 PROCEDURE — 73630 X-RAY EXAM OF FOOT: CPT

## 2023-11-29 ENCOUNTER — TRANSCRIBE ORDERS (OUTPATIENT)
Dept: ADMINISTRATIVE | Facility: HOSPITAL | Age: 78
End: 2023-11-29
Payer: MEDICARE

## 2023-11-29 DIAGNOSIS — M79.671 RIGHT FOOT PAIN: Primary | ICD-10-CM

## 2024-07-05 PROBLEM — H34.8112 CENTRAL RETINAL VEIN OCCLUSION, RIGHT EYE, STABLE: Status: ACTIVE | Noted: 2024-07-05

## 2024-07-05 PROBLEM — M15.9 PRIMARY OSTEOARTHRITIS INVOLVING MULTIPLE JOINTS: Status: ACTIVE | Noted: 2024-07-05

## 2024-07-05 PROBLEM — R53.83 OTHER FATIGUE: Status: ACTIVE | Noted: 2024-07-05

## 2024-07-05 PROBLEM — M15.0 PRIMARY OSTEOARTHRITIS INVOLVING MULTIPLE JOINTS: Status: ACTIVE | Noted: 2024-07-05

## 2024-07-08 ENCOUNTER — TELEPHONE (OUTPATIENT)
Age: 79
End: 2024-07-08

## 2024-07-08 ENCOUNTER — OFFICE VISIT (OUTPATIENT)
Age: 79
End: 2024-07-08
Payer: MEDICARE

## 2024-07-08 VITALS
WEIGHT: 149 LBS | HEART RATE: 65 BPM | BODY MASS INDEX: 25.44 KG/M2 | SYSTOLIC BLOOD PRESSURE: 120 MMHG | DIASTOLIC BLOOD PRESSURE: 80 MMHG | HEIGHT: 64 IN | TEMPERATURE: 97.7 F

## 2024-07-08 DIAGNOSIS — H34.8112 CENTRAL RETINAL VEIN OCCLUSION, RIGHT EYE, STABLE: ICD-10-CM

## 2024-07-08 DIAGNOSIS — M79.7 FIBROMYALGIA: Primary | ICD-10-CM

## 2024-07-08 DIAGNOSIS — M81.0 AGE-RELATED OSTEOPOROSIS WITHOUT CURRENT PATHOLOGICAL FRACTURE: ICD-10-CM

## 2024-07-08 DIAGNOSIS — M15.9 PRIMARY OSTEOARTHRITIS INVOLVING MULTIPLE JOINTS: ICD-10-CM

## 2024-07-08 PROCEDURE — 3074F SYST BP LT 130 MM HG: CPT | Performed by: NURSE PRACTITIONER

## 2024-07-08 PROCEDURE — G2211 COMPLEX E/M VISIT ADD ON: HCPCS | Performed by: NURSE PRACTITIONER

## 2024-07-08 PROCEDURE — 3079F DIAST BP 80-89 MM HG: CPT | Performed by: NURSE PRACTITIONER

## 2024-07-08 PROCEDURE — 1160F RVW MEDS BY RX/DR IN RCRD: CPT | Performed by: NURSE PRACTITIONER

## 2024-07-08 PROCEDURE — 99214 OFFICE O/P EST MOD 30 MIN: CPT | Performed by: NURSE PRACTITIONER

## 2024-07-08 PROCEDURE — 1159F MED LIST DOCD IN RCRD: CPT | Performed by: NURSE PRACTITIONER

## 2024-07-08 NOTE — ASSESSMENT & PLAN NOTE
Positive DARLING 1:160, negative dsDNA, negative SHARDA (2012)  Negative DARLING, normal complements, CRP normal (04/2013)  Lumbar x-ray showed osteoarthritis of the lumbar spine (2023)    - She cannot take NSAIDs due to anticoagulation.  - She has had chronic back and neck pain for years.  She uses salon pas patches which are helpful.  Voltaren gel is helpful.  - She has intermittent medial knee pain.  - She has foot pain.  - She has bilateral CMC joint pain intermittently.  - She rarely uses Tramadol. She does not find it very helpful.  - Trial TENS unit

## 2024-07-08 NOTE — ASSESSMENT & PLAN NOTE
DEXA 10/15/2021 largely unchanged from previous in 2019.  She still has osteoporosis in the right femoral neck (-2.5) and osteopenia of left femoral neck and lumbar spine  DEXA 1/10/23: Left femoral neck -2.4 and spine -1.9    - Continue 600 mg calcium daily and 2000 IU vitamin D3 daily.  - Continue Prolia every 6 months. She had less than 6 months ago at an outside infusion center.  I will find out where she is getting Prolia and request records.  - Will recheck calcium and Vitamin D prior to next Prolia.  - Repeat DEXA 1/2026    - Risk benefits of bisphosphonates/Prolia discussed in detail including but not limited to osteonecrosis jaw, atypical femoral fracture, bone death, kidney failure, hypocalcemia.

## 2024-07-08 NOTE — PROGRESS NOTES
Office Visit       Date: 07/08/2024   Patient Name: Marilyn Bernal  MRN: [id number]  YOB: 1945    Referring Physician: Lisa Napier MD     Chief Complaint: Osteoarthritis and fibromyalgia      History of Present Illness: Marilyn Bernal is a 78 y.o. female who is here today for follow-up of osteoarthritis and fibromyalgia.  She reports feeling the same.  She rates her pain 5 out of 10 and denies morning stiffness.  She denies any recent injuries or infections.  She has had a recent lab work. We do not currently prescribe her any medication.  Cymbalta was stopped due to concern of serotonin sickness.  She is accompanied by her  today.  Subjective   Review of Systems:  Review of Systems   Constitutional:  Positive for fatigue and unexpected weight change.   HENT:  Positive for hearing loss, sore throat and tinnitus.         Dry eyes, dry mouth, hoarseness, oral ulcers   Eyes:  Positive for pain, redness and visual disturbance.   Respiratory:  Positive for cough.    Gastrointestinal:  Positive for constipation and diarrhea.        Loss of appetite, she feels full after small meal   All other systems reviewed and are negative.       Past Medical History:   Past Medical History:   Diagnosis Date    Abnormal gait     Reported by patient-slower, smaller steps    Anxiety     Arrhythmia 2014    Arthritis     Broken arm     right    Broken bones     pelvis    Broken ribs     Cataracts, bilateral     Deep vein thrombosis     Depression     Fibromyalgia, primary     Fracture     HL (hearing loss)     Hyperlipidemia     Hypertension     IBS (irritable bowel syndrome)     Insomnia     Migraine     Osteoporosis     Stroke        Past Surgical History:   Past Surgical History:   Procedure Laterality Date    CATARACT EXTRACTION Bilateral     LIPOMA EXCISION      Removal-[facility] in [facility]    MIDDLE EAR SURGERY  1967    [place], [place]       Family History:   Family History    Problem Relation Age of Onset    Cancer Mother     Osteoporosis Mother     Stroke Mother     Glaucoma Mother     Colon cancer Mother     Heart attack Father     Asthma Sister     Migraines Son        Social History:   Social History     Socioeconomic History    Marital status:    Tobacco Use    Smoking status: Never    Smokeless tobacco: Never   Vaping Use    Vaping status: Never Used   Substance and Sexual Activity    Alcohol use: Yes     Alcohol/week: 10.0 standard drinks of alcohol     Types: 5 Glasses of wine, 5 Standard drinks or equivalent per week    Drug use: Never    Sexual activity: Not Currently     Partners: Male       Medications:   Current Outpatient Medications:     aspirin 81 MG EC tablet, Take 1 tablet by mouth Daily., Disp: , Rfl:     buPROPion XL (WELLBUTRIN XL) 300 MG 24 hr tablet, Take 1 tablet by mouth Every Morning., Disp: , Rfl:     busPIRone (BUSPAR) 10 MG tablet, Take 1 tablet by mouth 3 (Three) Times a Day., Disp: , Rfl:     calcium carbonate-vitamin d 600-400 MG-UNIT per tablet, bid, Disp: , Rfl:     cholecalciferol (VITAMIN D3) 25 MCG (1000 UT) tablet, once daily, Disp: , Rfl:     denosumab (Prolia) 60 MG/ML solution prefilled syringe syringe, Inject 1 mL under the skin into the appropriate area as directed Every 6 (Six) Months., Disp: , Rfl:     Diclofenac Sodium (VOLTAREN) 1 % gel gel, As Needed., Disp: , Rfl:     Eliquis 5 MG tablet tablet, Take 1 tablet by mouth 2 (Two) Times a Day., Disp: , Rfl:     folic acid (FOLVITE) 1 MG tablet, Take 1 tablet by mouth Daily., Disp: , Rfl:     metoprolol succinate XL (TOPROL-XL) 25 MG 24 hr tablet, Take 1 tablet by mouth Daily., Disp: 30 tablet, Rfl: 6    olmesartan (BENICAR) 20 MG tablet, Take 1 tablet by mouth Daily. Further refills from pcp., Disp: 90 tablet, Rfl: 0    Omega-3 Fatty Acids (fish oil) 1000 MG capsule capsule, Take  by mouth Daily With Breakfast., Disp: , Rfl:     rosuvastatin (CRESTOR) 10 MG tablet, Take 1 tablet by  "mouth Daily., Disp: , Rfl:     timolol (Betimol) 0.25 % ophthalmic solution, Apply 1 drop to eye(s) as directed by provider 2 (Two) Times a Day., Disp: , Rfl:     traMADol (ULTRAM) 50 MG tablet, Take 1 tablet by mouth Every 6 (Six) Hours As Needed., Disp: , Rfl:     zolpidem CR (AMBIEN CR) 12.5 MG CR tablet, Take 1 tablet by mouth Every Night., Disp: , Rfl:     escitalopram (LEXAPRO) 20 MG tablet, Take 20 mg by mouth Every Night., Disp: , Rfl:     ketorolac (ACULAR) 0.4 % solution, , Disp: , Rfl:     latanoprost (XALATAN) 0.005 % ophthalmic solution, latanoprost 0.005 % eye drops  INSTILL 1 DROP INTO AFFECTED EYE(S) BY OPHTHALMIC ROUTE ONCE DAILY INTHE EVENING, Disp: , Rfl:     ofloxacin (OCUFLOX) 0.3 % ophthalmic solution, , Disp: , Rfl:     prednisoLONE acetate (PRED FORTE) 1 % ophthalmic suspension, , Disp: , Rfl:     timolol (TIMOPTIC) 0.5 % ophthalmic solution, Administer 1 drop to both eyes Daily. (Patient not taking: Reported on 7/8/2024), Disp: , Rfl:     Allergies: No Known Allergies    I have reviewed and updated the patient's chief complaint, history of present illness, review of systems, past medical history, surgical history, family history, social history, medications and allergy list as appropriate.     Objective    Vital Signs:   Vitals:    07/08/24 1036   BP: 120/80   BP Location: Right arm   Pulse: 65   Temp: 97.7 °F (36.5 °C)   Weight: 67.6 kg (149 lb)   Height: 162.6 cm (64\")   PainSc:   5   PainLoc: Generalized  Comment: back     Body mass index is 25.58 kg/m².   BMI is >= 25 and <30. (Overweight) The following options were offered after discussion;: Defer to PCP       Physical Exam:  Physical Exam  Vitals reviewed.   Constitutional:       Appearance: Normal appearance.   HENT:      Head: Normocephalic and atraumatic.      Mouth/Throat:      Mouth: Mucous membranes are moist.   Eyes:      Conjunctiva/sclera: Conjunctivae normal.   Cardiovascular:      Rate and Rhythm: Normal rate and regular " rhythm.      Pulses: Normal pulses.      Heart sounds: Normal heart sounds.   Pulmonary:      Effort: Pulmonary effort is normal.      Breath sounds: Normal breath sounds.   Musculoskeletal:         General: Normal range of motion.      Cervical back: Normal range of motion and neck supple.      Right lower le+ Edema present.      Comments: Tender left CMC joint.  Tender lumbar and cervical sine.  15/18 myofascial tender points     Skin:     General: Skin is warm and dry.   Neurological:      General: No focal deficit present.      Mental Status: She is alert and oriented to person, place, and time. Mental status is at baseline.   Psychiatric:         Mood and Affect: Mood normal.         Behavior: Behavior normal.         Thought Content: Thought content normal.         Judgment: Judgment normal.          Results Review:   Imaging Results (Last 24 Hours)       ** No results found for the last 24 hours. **            Procedures    Assessment / Plan    Assessment/Plan:   Diagnoses and all orders for this visit:    1. Fibromyalgia (Primary)  Assessment & Plan:  - Continue follow-up with primary care for management of anxiety and depression. She is currently on bupropion and buspirone.  - She has completed PT  - Encouraged daily walking.  - Consider pain management referral.  - Will consider trying a muscle relaxer. They are hesitant to add anything due to side effects.  - I have encouraged regular low impact aerobic exercise up to 30 minutes per day. If this is unattainable, recommend a graded exercise program starting with 5 minutes of dedicated cardiovascular exercise daily, increasing by 1 minute daily until goal of 30 minutes daily is reached.  - I have recommended conservative measures to improve sleep.  - Continue follow-up with primary care for medical management.  - Recommend avoiding narcotics studies have shown that narcotics can worsen fibromyalgia pain.   - Counseled on alternative therapies that can  help with pain including massage therapy, aquatic therapy, physical therapy, acupuncture, chiropractics, and psychology evaluation.  - She reports fibromyalgia is intermittent.  Back pain overrides fibromyalgia pain at time.  - She does not want to try aqua therapy.       2. Primary osteoarthritis involving multiple joints  Assessment & Plan:  Positive DARLING 1:160, negative dsDNA, negative SHARDA (2012)  Negative DARLING, normal complements, CRP normal (04/2013)  Lumbar x-ray showed osteoarthritis of the lumbar spine (2023)    - She cannot take NSAIDs due to anticoagulation.  - She has had chronic back and neck pain for years.  She uses salon pas patches which are helpful.  Voltaren gel is helpful.  - She has intermittent medial knee pain.  - She has foot pain.  - She has bilateral CMC joint pain intermittently.  - She rarely uses Tramadol. She does not find it very helpful.  - Trial TENS unit      3. Age-related osteoporosis without current pathological fracture  Assessment & Plan:  DEXA 10/15/2021 largely unchanged from previous in 2019.  She still has osteoporosis in the right femoral neck (-2.5) and osteopenia of left femoral neck and lumbar spine  DEXA 1/10/23: Left femoral neck -2.4 and spine -1.9    - Continue 600 mg calcium daily and 2000 IU vitamin D3 daily.  - Continue Prolia every 6 months. She had less than 6 months ago at an outside infusion center.  I will find out where she is getting Prolia and request records.  - Will recheck calcium and Vitamin D prior to next Prolia.  - Repeat DEXA 1/2026    - Risk benefits of bisphosphonates/Prolia discussed in detail including but not limited to osteonecrosis jaw, atypical femoral fracture, bone death, kidney failure, hypocalcemia.      4. Central retinal vein occlusion, right eye, stable  Assessment & Plan:  - Currently on Eliquis.  - Continue follow up with ophthalmology.            Follow Up:   Return in about 6 months (around 1/8/2025) for JOHN Page, Michelle Yuen,  JOHN.        JOHN Thomas  [facility]

## 2024-07-08 NOTE — TELEPHONE ENCOUNTER
Please get most recent Prolia record from [facility].  She is unsure of date but said it had been within the last 6 months.

## 2024-07-08 NOTE — ASSESSMENT & PLAN NOTE
- Continue follow-up with primary care for management of anxiety and depression. She is currently on bupropion and buspirone.  - She has completed PT  - Encouraged daily walking.  - Consider pain management referral.  - Will consider trying a muscle relaxer. They are hesitant to add anything due to side effects.  - I have encouraged regular low impact aerobic exercise up to 30 minutes per day. If this is unattainable, recommend a graded exercise program starting with 5 minutes of dedicated cardiovascular exercise daily, increasing by 1 minute daily until goal of 30 minutes daily is reached.  - I have recommended conservative measures to improve sleep.  - Continue follow-up with primary care for medical management.  - Recommend avoiding narcotics studies have shown that narcotics can worsen fibromyalgia pain.   - Counseled on alternative therapies that can help with pain including massage therapy, aquatic therapy, physical therapy, acupuncture, chiropractics, and psychology evaluation.  - She reports fibromyalgia is intermittent.  Back pain overrides fibromyalgia pain at time.  - She does not want to try aqua therapy.

## 2024-10-10 ENCOUNTER — TRANSCRIBE ORDERS (OUTPATIENT)
Dept: GENERAL RADIOLOGY | Facility: HOSPITAL | Age: 79
End: 2024-10-10
Payer: MEDICARE

## 2024-10-10 ENCOUNTER — HOSPITAL ENCOUNTER (OUTPATIENT)
Dept: GENERAL RADIOLOGY | Facility: HOSPITAL | Age: 79
Discharge: HOME | End: 2024-10-10
Admitting: STUDENT IN AN ORGANIZED HEALTH CARE EDUCATION/TRAINING PROGRAM
Payer: MEDICARE

## 2024-10-10 DIAGNOSIS — M54.50 LOW BACK PAIN, UNSPECIFIED BACK PAIN LATERALITY, UNSPECIFIED CHRONICITY, UNSPECIFIED WHETHER SCIATICA PRESENT: ICD-10-CM

## 2024-10-10 DIAGNOSIS — M54.50 LOW BACK PAIN, UNSPECIFIED BACK PAIN LATERALITY, UNSPECIFIED CHRONICITY, UNSPECIFIED WHETHER SCIATICA PRESENT: Primary | ICD-10-CM

## 2024-10-10 PROCEDURE — 72100 X-RAY EXAM L-S SPINE 2/3 VWS: CPT

## 2025-01-23 ENCOUNTER — HOSPITAL ENCOUNTER (OUTPATIENT)
Dept: GENERAL RADIOLOGY | Facility: HOSPITAL | Age: 80
Discharge: HOME | End: 2025-01-23
Admitting: INTERNAL MEDICINE
Payer: MEDICARE

## 2025-01-23 ENCOUNTER — TRANSCRIBE ORDERS (OUTPATIENT)
Dept: GENERAL RADIOLOGY | Facility: HOSPITAL | Age: 80
End: 2025-01-23
Payer: MEDICARE

## 2025-01-23 DIAGNOSIS — R07.9 CHEST PAIN, UNSPECIFIED TYPE: ICD-10-CM

## 2025-01-23 DIAGNOSIS — R07.9 CHEST PAIN, UNSPECIFIED TYPE: Primary | ICD-10-CM

## 2025-01-23 PROCEDURE — 71046 X-RAY EXAM CHEST 2 VIEWS: CPT

## 2025-01-29 NOTE — H&P (VIEW-ONLY)
[facility] Cardiology  Follow Up Visit  Marilyn Bernal  1945    VISIT DATE:  01/30/25    PCP:   Lisa Napier MD  [address] [address]          CC:  Chest Pain      Problem List:  1.  Retinal artery occlusion  2.  Hypertension  3.  Hyperlipidemia  4.  Fall with prior pelvic and rib fractures     Cardiac testing:      Echo October 2021  Estimated right ventricular systolic pressure from tricuspid regurgitation is normal (<35     mmHg). Calculated right ventricular systolic pressure from tricuspid regurgitation is 31      mmHg.  Estimated left ventricular EF = 65% Estimated left ventricular EF was in agreement          with the calculated left ventricular EF. Left ventricular ejection fraction appears to be 61          - 65%. Left ventricular systolic function is normal.  Left ventricular diastolic function is consistent with age.  Normal right ventricular cavity size, wall thickness, systolic function and septal motion      noted.  No evidence of pulmonary hypertension is present.  There is no evidence of pericardial effusion.  The aortic valve exhibits mild sclerosis.  Saline test for shunting not performed.        Carotid duplex October 2021  Right internal carotid artery stenosis of 0-49%.  Left internal carotid artery stenosis of 0-49%.  Vertebral artery flow is antegrade bilaterally  Elevated velocities in the right subclavian artery suggesting a mild, less than 50% stenosis.     Holter October 2021  A relatively benign monitor study.  Short episodes atrial tachycardia. No episodes of atrial fibrillation. Longest atrial tach episode was 10 beats.  No other arrhythmias noted               History of Present Illness:  Marilyn Bernal  Is a 79 y.o. female with pertinent cardiac history detailed above.  Patient being seen for chest pain.  Patient states last episode of chest pain was around 3 weeks ago.  She states it was relatively severe moved into her  arms.  However only lasted for couple minutes.  She had in the preceding months of pain like this maybe once a week again typically sure over within a couple minutes.  She is in no acute distress today in the office.  EKG done last week at Dr. Napier's office does show a Q wave in V1 and V2.  This does not appear significantly changed compared with our last EKG on file in 2021.  She has not had reproducible exertional symptoms since.  She is on Eliquis due to history of retinal artery occlusion and aspirin.      Patient Active Problem List    Diagnosis Date Noted    Primary osteoarthritis involving multiple joints 07/05/2024    Central retinal vein occlusion, right eye, stable 07/05/2024    Other fatigue 07/05/2024    Balance disorder 11/18/2021    Unsteady gait 11/18/2021    Anxiety     Arthritis     Cataracts, bilateral     HL (hearing loss)     Hypertension     IBS (irritable bowel syndrome)     Migraine     Combined forms of age-related cataract of both eyes 07/20/2021    CRAO (central retinal artery occlusion), right 07/20/2021    Hyperopia of both eyes 07/20/2021    Open angle with borderline findings, high risk, bilateral 07/20/2021    Presbyopia 07/20/2021    Suspicious optic nerve cupping of both eyes 07/20/2021    Fibromyalgia 07/16/2021    Osteoporosis 07/16/2021       No Known Allergies    Social History     Socioeconomic History    Marital status:    Tobacco Use    Smoking status: Never    Smokeless tobacco: Never   Vaping Use    Vaping status: Never Used   Substance and Sexual Activity    Alcohol use: Yes     Alcohol/week: 10.0 standard drinks of alcohol     Types: 5 Glasses of wine, 5 Standard drinks or equivalent per week    Drug use: Never    Sexual activity: Not Currently     Partners: Male       Family History   Problem Relation Age of Onset    Cancer Mother     Osteoporosis Mother     Stroke Mother     Glaucoma Mother     Colon cancer Mother     Heart attack Father     Asthma Sister      Migraines Son        Current Medications:    Current Outpatient Medications:     aspirin 81 MG EC tablet, Take 1 tablet by mouth Daily., Disp: , Rfl:     busPIRone (BUSPAR) 10 MG tablet, Take 1 tablet by mouth 3 (Three) Times a Day., Disp: , Rfl:     calcium carbonate-vitamin d 600-400 MG-UNIT per tablet, bid, Disp: , Rfl:     chlorhexidine (PERIDEX) 0.12 % solution, RINSE AND GARGLE 15 ML BY MOUTH TWICE DAILY, Disp: , Rfl:     cholecalciferol (VITAMIN D3) 25 MCG (1000 UT) tablet, once daily, Disp: , Rfl:     denosumab (Prolia) 60 MG/ML solution prefilled syringe syringe, Inject 1 mL under the skin into the appropriate area as directed Every 6 (Six) Months., Disp: , Rfl:     Diclofenac Sodium (VOLTAREN) 1 % gel gel, As Needed., Disp: , Rfl:     DULoxetine (CYMBALTA) 20 MG capsule, Take 1 capsule by mouth Daily., Disp: , Rfl:     Eliquis 5 MG tablet tablet, Take 1 tablet by mouth 2 (Two) Times a Day., Disp: , Rfl:     folic acid (FOLVITE) 1 MG tablet, Take 1 tablet by mouth Daily., Disp: , Rfl:     Lidocaine Viscous HCl (XYLOCAINE) 2 % solution, , Disp: , Rfl:     metoprolol succinate XL (TOPROL-XL) 25 MG 24 hr tablet, Take 1 tablet by mouth Daily., Disp: 30 tablet, Rfl: 6    multivitamin with minerals (Centrum Silver 50+Women) tablet tablet, Take  by mouth Daily., Disp: , Rfl:     olmesartan (BENICAR) 20 MG tablet, Take 1 tablet by mouth Daily. Further refills from pcp., Disp: 90 tablet, Rfl: 0    Omega-3 Fatty Acids (fish oil) 1000 MG capsule capsule, Take  by mouth Daily With Breakfast., Disp: , Rfl:     rosuvastatin (CRESTOR) 10 MG tablet, Take 1 tablet by mouth Daily., Disp: , Rfl:     saccharomyces boulardii (FLORASTOR) 250 MG capsule, Take 1 capsule by mouth 2 (Two) Times a Day., Disp: , Rfl:     timolol (TIMOPTIC) 0.5 % ophthalmic solution, Administer 1 drop to both eyes Daily., Disp: , Rfl:     traMADol (ULTRAM) 50 MG tablet, Take 1 tablet by mouth Every 6 (Six) Hours As Needed., Disp: , Rfl:     zolpidem CR  "(AMBIEN CR) 12.5 MG CR tablet, Take 1 tablet by mouth Every Night., Disp: , Rfl:     buPROPion XL (WELLBUTRIN XL) 300 MG 24 hr tablet, Take 1 tablet by mouth Every Morning., Disp: , Rfl:     escitalopram (LEXAPRO) 20 MG tablet, Take 1 tablet by mouth Every Night., Disp: , Rfl:     ketorolac (ACULAR) 0.4 % solution, , Disp: , Rfl:     latanoprost (XALATAN) 0.005 % ophthalmic solution, latanoprost 0.005 % eye drops  INSTILL 1 DROP INTO AFFECTED EYE(S) BY OPHTHALMIC ROUTE ONCE DAILY INTHE EVENING, Disp: , Rfl:     nitroglycerin (NITROSTAT) 0.4 MG SL tablet, 1 under the tongue as needed for angina, may repeat q5mins for up three doses, Disp: 30 tablet, Rfl: 6    ofloxacin (OCUFLOX) 0.3 % ophthalmic solution, , Disp: , Rfl:     prednisoLONE acetate (PRED FORTE) 1 % ophthalmic suspension, , Disp: , Rfl:     timolol (Betimol) 0.25 % ophthalmic solution, Apply 1 drop to eye(s) as directed by provider 2 (Two) Times a Day., Disp: , Rfl:      ROS    Vitals:    01/30/25 1045   BP: 114/68   BP Location: Left arm   Patient Position: Sitting   Pulse: 73   SpO2: 93%   Weight: 66.7 kg (147 lb)   Height: 162.6 cm (64\")       Physical Exam    Diagnostic Data:  Procedures  No results found for: \"CHLPL\", \"TRIG\", \"HDL\", \"LDLDIRECT\"  Lab Results   Component Value Date    GLUCOSE 106 (H) 08/05/2022    BUN 13 08/05/2022    CREATININE 0.77 08/05/2022     08/05/2022    K 4.7 08/05/2022     08/05/2022    CO2 24.0 08/05/2022     No results found for: \"HGBA1C\"  Lab Results   Component Value Date    WBC 9.11 09/15/2021    HGB 14.5 09/15/2021    HCT 44.9 09/15/2021     09/15/2021       Assessment:   Diagnosis Plan   1. Stable angina pectoris  CT Angiogram Coronary    CT Angiogram Coronary-Cardiology Interpretation    metoprolol tartrate (LOPRESSOR) tablet 200 mg    metoprolol tartrate (LOPRESSOR) tablet 150 mg    metoprolol tartrate (LOPRESSOR) tablet 100 mg    metoprolol tartrate (LOPRESSOR) tablet 50 mg    metoprolol tartrate " (LOPRESSOR) tablet 25 mg    metoprolol tartrate (LOPRESSOR) tablet 50 mg    metoprolol tartrate (LOPRESSOR) injection 5 mg    nitroglycerin (NITROSTAT) SL tablet 0.4 mg    nitroglycerin (NITROSTAT) SL tablet 0.8 mg    ivabradine HCl (CORLANOR) tablet 15 mg    No Caffeine or Nicotine 4 Hours Prior to CTA Appointment    Nothing to Eat or Drink 4 Hours Prior to CTA Appointment    Do Not Take Phosphodiasterase Inhibitors in the 72 Hours Prior to Coronary CTA    Obtain Informed Consent - Computed Tomography Angiography of Chest - Angiogram of Coronary Arteries    Vital Signs Upon Arrival    Cardiac Monitoring    Verify NPO Status - Patient to Be NPO at Least 4 Hours Prior to CTA    Notify CT After Administration of metoprolol tartrate (LOPRESSOR) tablet    Notify Provider If Total Metoprolol Given Equals 300mg & Heart Rate Not At Goal    Notify Provider Prior to Administration of Nitroglycerin if Patient SBP <80    POC Creatinine    Insert Peripheral IV    Saline Lock & Maintain IV Access    sodium chloride 0.9 % flush 10 mL    sodium chloride 0.9 % flush 10 mL    sodium chloride 0.9 % infusion 40 mL    Vital Signs - See Instructions    Hold Medication Metformin (Glucophage, Glucophage XR, Fortament, Glumetza);  Metglip (metformin/glipizide);  Glucovance (metformin/glyburide); Avandamet (metformin/rosiglitazone)    Patient May Discharge Home After Procedure Complete (If Stable)          Plan:    1.  Right retinal artery occlusion  -On aspirin, Eliquis, and a statin  -echo showed normal LVEF, no hemodynamically significant carotid stenosis on duplex  -Holter showed short episodes of atrial tachycardia longest 10 beats, no A. Fib    2.  Hypertension  -Benicar 20 mg  -Toprol-XL  -Blood pressure controlled        3.  Hyperlipidemia  On Crestor     4.  Chest pain,  Will order coronary CTA.  Also prescribed sublingual nitroglycerin with chest pain precautions.  Will recommend cardiac catheterization of obstructive disease  demonstrated        ACP discussion was held with the patient during this visit. Patient does not have an advance directive, declines further assistance.      Reuben Hines MD FACC

## 2025-01-29 NOTE — PROGRESS NOTES
[facility] Cardiology  Follow Up Visit  Marilyn Bernal  1945    VISIT DATE:  01/30/25    PCP:   Lisa Napier MD  [address] [address]          CC:  Chest Pain      Problem List:  1.  Retinal artery occlusion  2.  Hypertension  3.  Hyperlipidemia  4.  Fall with prior pelvic and rib fractures     Cardiac testing:      Echo October 2021  Estimated right ventricular systolic pressure from tricuspid regurgitation is normal (<35     mmHg). Calculated right ventricular systolic pressure from tricuspid regurgitation is 31      mmHg.  Estimated left ventricular EF = 65% Estimated left ventricular EF was in agreement          with the calculated left ventricular EF. Left ventricular ejection fraction appears to be 61          - 65%. Left ventricular systolic function is normal.  Left ventricular diastolic function is consistent with age.  Normal right ventricular cavity size, wall thickness, systolic function and septal motion      noted.  No evidence of pulmonary hypertension is present.  There is no evidence of pericardial effusion.  The aortic valve exhibits mild sclerosis.  Saline test for shunting not performed.        Carotid duplex October 2021  Right internal carotid artery stenosis of 0-49%.  Left internal carotid artery stenosis of 0-49%.  Vertebral artery flow is antegrade bilaterally  Elevated velocities in the right subclavian artery suggesting a mild, less than 50% stenosis.     Holter October 2021  A relatively benign monitor study.  Short episodes atrial tachycardia. No episodes of atrial fibrillation. Longest atrial tach episode was 10 beats.  No other arrhythmias noted               History of Present Illness:  Marilyn Bernal  Is a 79 y.o. female with pertinent cardiac history detailed above.  Patient being seen for chest pain.  Patient states last episode of chest pain was around 3 weeks ago.  She states it was relatively severe moved into her  arms.  However only lasted for couple minutes.  She had in the preceding months of pain like this maybe once a week again typically sure over within a couple minutes.  She is in no acute distress today in the office.  EKG done last week at Dr. Napier's office does show a Q wave in V1 and V2.  This does not appear significantly changed compared with our last EKG on file in 2021.  She has not had reproducible exertional symptoms since.  She is on Eliquis due to history of retinal artery occlusion and aspirin.      Patient Active Problem List    Diagnosis Date Noted    Primary osteoarthritis involving multiple joints 07/05/2024    Central retinal vein occlusion, right eye, stable 07/05/2024    Other fatigue 07/05/2024    Balance disorder 11/18/2021    Unsteady gait 11/18/2021    Anxiety     Arthritis     Cataracts, bilateral     HL (hearing loss)     Hypertension     IBS (irritable bowel syndrome)     Migraine     Combined forms of age-related cataract of both eyes 07/20/2021    CRAO (central retinal artery occlusion), right 07/20/2021    Hyperopia of both eyes 07/20/2021    Open angle with borderline findings, high risk, bilateral 07/20/2021    Presbyopia 07/20/2021    Suspicious optic nerve cupping of both eyes 07/20/2021    Fibromyalgia 07/16/2021    Osteoporosis 07/16/2021       No Known Allergies    Social History     Socioeconomic History    Marital status:    Tobacco Use    Smoking status: Never    Smokeless tobacco: Never   Vaping Use    Vaping status: Never Used   Substance and Sexual Activity    Alcohol use: Yes     Alcohol/week: 10.0 standard drinks of alcohol     Types: 5 Glasses of wine, 5 Standard drinks or equivalent per week    Drug use: Never    Sexual activity: Not Currently     Partners: Male       Family History   Problem Relation Age of Onset    Cancer Mother     Osteoporosis Mother     Stroke Mother     Glaucoma Mother     Colon cancer Mother     Heart attack Father     Asthma Sister      Migraines Son        Current Medications:    Current Outpatient Medications:     aspirin 81 MG EC tablet, Take 1 tablet by mouth Daily., Disp: , Rfl:     busPIRone (BUSPAR) 10 MG tablet, Take 1 tablet by mouth 3 (Three) Times a Day., Disp: , Rfl:     calcium carbonate-vitamin d 600-400 MG-UNIT per tablet, bid, Disp: , Rfl:     chlorhexidine (PERIDEX) 0.12 % solution, RINSE AND GARGLE 15 ML BY MOUTH TWICE DAILY, Disp: , Rfl:     cholecalciferol (VITAMIN D3) 25 MCG (1000 UT) tablet, once daily, Disp: , Rfl:     denosumab (Prolia) 60 MG/ML solution prefilled syringe syringe, Inject 1 mL under the skin into the appropriate area as directed Every 6 (Six) Months., Disp: , Rfl:     Diclofenac Sodium (VOLTAREN) 1 % gel gel, As Needed., Disp: , Rfl:     DULoxetine (CYMBALTA) 20 MG capsule, Take 1 capsule by mouth Daily., Disp: , Rfl:     Eliquis 5 MG tablet tablet, Take 1 tablet by mouth 2 (Two) Times a Day., Disp: , Rfl:     folic acid (FOLVITE) 1 MG tablet, Take 1 tablet by mouth Daily., Disp: , Rfl:     Lidocaine Viscous HCl (XYLOCAINE) 2 % solution, , Disp: , Rfl:     metoprolol succinate XL (TOPROL-XL) 25 MG 24 hr tablet, Take 1 tablet by mouth Daily., Disp: 30 tablet, Rfl: 6    multivitamin with minerals (Centrum Silver 50+Women) tablet tablet, Take  by mouth Daily., Disp: , Rfl:     olmesartan (BENICAR) 20 MG tablet, Take 1 tablet by mouth Daily. Further refills from pcp., Disp: 90 tablet, Rfl: 0    Omega-3 Fatty Acids (fish oil) 1000 MG capsule capsule, Take  by mouth Daily With Breakfast., Disp: , Rfl:     rosuvastatin (CRESTOR) 10 MG tablet, Take 1 tablet by mouth Daily., Disp: , Rfl:     saccharomyces boulardii (FLORASTOR) 250 MG capsule, Take 1 capsule by mouth 2 (Two) Times a Day., Disp: , Rfl:     timolol (TIMOPTIC) 0.5 % ophthalmic solution, Administer 1 drop to both eyes Daily., Disp: , Rfl:     traMADol (ULTRAM) 50 MG tablet, Take 1 tablet by mouth Every 6 (Six) Hours As Needed., Disp: , Rfl:     zolpidem CR  "(AMBIEN CR) 12.5 MG CR tablet, Take 1 tablet by mouth Every Night., Disp: , Rfl:     buPROPion XL (WELLBUTRIN XL) 300 MG 24 hr tablet, Take 1 tablet by mouth Every Morning., Disp: , Rfl:     escitalopram (LEXAPRO) 20 MG tablet, Take 1 tablet by mouth Every Night., Disp: , Rfl:     ketorolac (ACULAR) 0.4 % solution, , Disp: , Rfl:     latanoprost (XALATAN) 0.005 % ophthalmic solution, latanoprost 0.005 % eye drops  INSTILL 1 DROP INTO AFFECTED EYE(S) BY OPHTHALMIC ROUTE ONCE DAILY INTHE EVENING, Disp: , Rfl:     nitroglycerin (NITROSTAT) 0.4 MG SL tablet, 1 under the tongue as needed for angina, may repeat q5mins for up three doses, Disp: 30 tablet, Rfl: 6    ofloxacin (OCUFLOX) 0.3 % ophthalmic solution, , Disp: , Rfl:     prednisoLONE acetate (PRED FORTE) 1 % ophthalmic suspension, , Disp: , Rfl:     timolol (Betimol) 0.25 % ophthalmic solution, Apply 1 drop to eye(s) as directed by provider 2 (Two) Times a Day., Disp: , Rfl:      ROS    Vitals:    01/30/25 1045   BP: 114/68   BP Location: Left arm   Patient Position: Sitting   Pulse: 73   SpO2: 93%   Weight: 66.7 kg (147 lb)   Height: 162.6 cm (64\")       Physical Exam    Diagnostic Data:  Procedures  No results found for: \"CHLPL\", \"TRIG\", \"HDL\", \"LDLDIRECT\"  Lab Results   Component Value Date    GLUCOSE 106 (H) 08/05/2022    BUN 13 08/05/2022    CREATININE 0.77 08/05/2022     08/05/2022    K 4.7 08/05/2022     08/05/2022    CO2 24.0 08/05/2022     No results found for: \"HGBA1C\"  Lab Results   Component Value Date    WBC 9.11 09/15/2021    HGB 14.5 09/15/2021    HCT 44.9 09/15/2021     09/15/2021       Assessment:   Diagnosis Plan   1. Stable angina pectoris  CT Angiogram Coronary    CT Angiogram Coronary-Cardiology Interpretation    metoprolol tartrate (LOPRESSOR) tablet 200 mg    metoprolol tartrate (LOPRESSOR) tablet 150 mg    metoprolol tartrate (LOPRESSOR) tablet 100 mg    metoprolol tartrate (LOPRESSOR) tablet 50 mg    metoprolol tartrate " (LOPRESSOR) tablet 25 mg    metoprolol tartrate (LOPRESSOR) tablet 50 mg    metoprolol tartrate (LOPRESSOR) injection 5 mg    nitroglycerin (NITROSTAT) SL tablet 0.4 mg    nitroglycerin (NITROSTAT) SL tablet 0.8 mg    ivabradine HCl (CORLANOR) tablet 15 mg    No Caffeine or Nicotine 4 Hours Prior to CTA Appointment    Nothing to Eat or Drink 4 Hours Prior to CTA Appointment    Do Not Take Phosphodiasterase Inhibitors in the 72 Hours Prior to Coronary CTA    Obtain Informed Consent - Computed Tomography Angiography of Chest - Angiogram of Coronary Arteries    Vital Signs Upon Arrival    Cardiac Monitoring    Verify NPO Status - Patient to Be NPO at Least 4 Hours Prior to CTA    Notify CT After Administration of metoprolol tartrate (LOPRESSOR) tablet    Notify Provider If Total Metoprolol Given Equals 300mg & Heart Rate Not At Goal    Notify Provider Prior to Administration of Nitroglycerin if Patient SBP <80    POC Creatinine    Insert Peripheral IV    Saline Lock & Maintain IV Access    sodium chloride 0.9 % flush 10 mL    sodium chloride 0.9 % flush 10 mL    sodium chloride 0.9 % infusion 40 mL    Vital Signs - See Instructions    Hold Medication Metformin (Glucophage, Glucophage XR, Fortament, Glumetza);  Metglip (metformin/glipizide);  Glucovance (metformin/glyburide); Avandamet (metformin/rosiglitazone)    Patient May Discharge Home After Procedure Complete (If Stable)          Plan:    1.  Right retinal artery occlusion  -On aspirin, Eliquis, and a statin  -echo showed normal LVEF, no hemodynamically significant carotid stenosis on duplex  -Holter showed short episodes of atrial tachycardia longest 10 beats, no A. Fib    2.  Hypertension  -Benicar 20 mg  -Toprol-XL  -Blood pressure controlled        3.  Hyperlipidemia  On Crestor     4.  Chest pain,  Will order coronary CTA.  Also prescribed sublingual nitroglycerin with chest pain precautions.  Will recommend cardiac catheterization of obstructive disease  demonstrated        ACP discussion was held with the patient during this visit. Patient does not have an advance directive, declines further assistance.      Reuben Hines MD FACC

## 2025-01-30 ENCOUNTER — OFFICE VISIT (OUTPATIENT)
Dept: CARDIOLOGY | Facility: CLINIC | Age: 80
End: 2025-01-30
Payer: MEDICARE

## 2025-01-30 VITALS
WEIGHT: 147 LBS | HEIGHT: 64 IN | DIASTOLIC BLOOD PRESSURE: 68 MMHG | OXYGEN SATURATION: 93 % | BODY MASS INDEX: 25.1 KG/M2 | SYSTOLIC BLOOD PRESSURE: 114 MMHG | HEART RATE: 73 BPM

## 2025-01-30 DIAGNOSIS — I10 HYPERTENSION, UNSPECIFIED TYPE: ICD-10-CM

## 2025-01-30 DIAGNOSIS — I20.89 STABLE ANGINA PECTORIS: Primary | ICD-10-CM

## 2025-01-30 DIAGNOSIS — I25.9 CHEST PAIN DUE TO MYOCARDIAL ISCHEMIA, UNSPECIFIED ISCHEMIC CHEST PAIN TYPE: ICD-10-CM

## 2025-01-30 RX ORDER — LIDOCAINE HYDROCHLORIDE 20 MG/ML
SOLUTION OROPHARYNGEAL
COMMUNITY
Start: 2025-01-10

## 2025-01-30 RX ORDER — SODIUM CHLORIDE 9 MG/ML
40 INJECTION, SOLUTION INTRAVENOUS AS NEEDED
Status: CANCELLED | OUTPATIENT
Start: 2025-01-30

## 2025-01-30 RX ORDER — SODIUM CHLORIDE 0.9 % (FLUSH) 0.9 %
10 SYRINGE (ML) INJECTION EVERY 12 HOURS SCHEDULED
Status: CANCELLED | OUTPATIENT
Start: 2025-01-30

## 2025-01-30 RX ORDER — NITROGLYCERIN 0.4 MG/1
TABLET SUBLINGUAL
Qty: 30 TABLET | Refills: 6 | Status: SHIPPED | OUTPATIENT
Start: 2025-01-30

## 2025-01-30 RX ORDER — SODIUM CHLORIDE 0.9 % (FLUSH) 0.9 %
10 SYRINGE (ML) INJECTION AS NEEDED
Status: CANCELLED | OUTPATIENT
Start: 2025-01-30

## 2025-01-30 RX ORDER — METOPROLOL TARTRATE 25 MG/1
150 TABLET, FILM COATED ORAL ONCE
Status: CANCELLED | OUTPATIENT
Start: 2025-01-30

## 2025-01-30 RX ORDER — CHLORHEXIDINE GLUCONATE ORAL RINSE 1.2 MG/ML
SOLUTION DENTAL
COMMUNITY

## 2025-01-30 RX ORDER — METOPROLOL TARTRATE 50 MG
50 TABLET ORAL
Status: CANCELLED | OUTPATIENT
Start: 2025-01-30

## 2025-01-30 RX ORDER — NITROGLYCERIN 0.4 MG/1
0.8 TABLET SUBLINGUAL
Status: CANCELLED | OUTPATIENT
Start: 2025-01-30

## 2025-01-30 RX ORDER — METOPROLOL TARTRATE 25 MG/1
200 TABLET, FILM COATED ORAL ONCE
Status: CANCELLED | OUTPATIENT
Start: 2025-01-30 | End: 2025-01-30

## 2025-01-30 RX ORDER — SACCHAROMYCES BOULARDII 250 MG
250 CAPSULE ORAL 2 TIMES DAILY
COMMUNITY

## 2025-01-30 RX ORDER — IVABRADINE 5 MG/1
15 TABLET, FILM COATED ORAL ONCE
Status: CANCELLED | OUTPATIENT
Start: 2025-01-30 | End: 2025-01-30

## 2025-01-30 RX ORDER — NITROGLYCERIN 0.4 MG/1
0.4 TABLET SUBLINGUAL
Status: CANCELLED | OUTPATIENT
Start: 2025-01-30 | End: 2025-01-30

## 2025-01-30 RX ORDER — DULOXETIN HYDROCHLORIDE 20 MG/1
1 CAPSULE, DELAYED RELEASE ORAL DAILY
COMMUNITY
Start: 2025-01-23

## 2025-01-30 RX ORDER — METOPROLOL TARTRATE 1 MG/ML
5 INJECTION, SOLUTION INTRAVENOUS
Status: CANCELLED | OUTPATIENT
Start: 2025-01-30

## 2025-01-30 RX ORDER — METOPROLOL TARTRATE 25 MG/1
100 TABLET, FILM COATED ORAL ONCE
Status: CANCELLED | OUTPATIENT
Start: 2025-01-30

## 2025-01-30 RX ORDER — METOPROLOL TARTRATE 25 MG/1
50 TABLET, FILM COATED ORAL ONCE
Status: CANCELLED | OUTPATIENT
Start: 2025-01-30

## 2025-01-30 RX ORDER — METOPROLOL TARTRATE 25 MG/1
25 TABLET, FILM COATED ORAL ONCE
Status: CANCELLED | OUTPATIENT
Start: 2025-01-30

## 2025-01-31 ENCOUNTER — TELEPHONE (OUTPATIENT)
Facility: HOSPITAL | Age: 80
End: 2025-01-31

## 2025-02-03 ENCOUNTER — HOSPITAL ENCOUNTER (OUTPATIENT)
Facility: HOSPITAL | Age: 80
Discharge: HOME | End: 2025-02-03
Payer: MEDICARE

## 2025-02-03 ENCOUNTER — HOSPITAL ENCOUNTER (OUTPATIENT)
Dept: CT IMAGING | Facility: HOSPITAL | Age: 80
Discharge: HOME | End: 2025-02-03
Payer: MEDICARE

## 2025-02-03 VITALS
RESPIRATION RATE: 16 BRPM | TEMPERATURE: 97.6 F | BODY MASS INDEX: 24.97 KG/M2 | OXYGEN SATURATION: 99 % | HEIGHT: 64 IN | DIASTOLIC BLOOD PRESSURE: 57 MMHG | SYSTOLIC BLOOD PRESSURE: 101 MMHG | WEIGHT: 146.28 LBS | HEART RATE: 60 BPM

## 2025-02-03 DIAGNOSIS — I20.89 STABLE ANGINA PECTORIS: ICD-10-CM

## 2025-02-03 DIAGNOSIS — R93.1 ABNORMAL FINDINGS DIAGNOSTIC IMAGING OF HEART AND CORONARY CIRCULATION: ICD-10-CM

## 2025-02-03 DIAGNOSIS — I20.0 UNSTABLE ANGINA: Primary | ICD-10-CM

## 2025-02-03 DIAGNOSIS — R93.1 ABNORMAL FINDINGS DIAGNOSTIC IMAGING OF HEART AND CORONARY CIRCULATION: Primary | ICD-10-CM

## 2025-02-03 PROCEDURE — 75574 CT ANGIO HRT W/3D IMAGE: CPT

## 2025-02-03 PROCEDURE — 75580 N-INVAS EST C FFR SW ALY CTA: CPT

## 2025-02-03 PROCEDURE — 25510000001 IOPAMIDOL PER 1 ML: Performed by: INTERNAL MEDICINE

## 2025-02-03 RX ORDER — METOPROLOL TARTRATE 1 MG/ML
5 INJECTION, SOLUTION INTRAVENOUS
Status: DISCONTINUED | OUTPATIENT
Start: 2025-02-03 | End: 2025-02-04 | Stop reason: HOSPADM

## 2025-02-03 RX ORDER — NITROGLYCERIN 0.4 MG/1
0.4 TABLET SUBLINGUAL
Status: COMPLETED | OUTPATIENT
Start: 2025-02-03 | End: 2025-02-03

## 2025-02-03 RX ORDER — METOPROLOL TARTRATE 100 MG/1
200 TABLET ORAL ONCE
Status: DISCONTINUED | OUTPATIENT
Start: 2025-02-03 | End: 2025-02-04 | Stop reason: HOSPADM

## 2025-02-03 RX ORDER — METOPROLOL TARTRATE 25 MG/1
50 TABLET, FILM COATED ORAL ONCE
Status: DISCONTINUED | OUTPATIENT
Start: 2025-02-03 | End: 2025-02-04 | Stop reason: HOSPADM

## 2025-02-03 RX ORDER — NITROGLYCERIN 0.4 MG/1
0.8 TABLET SUBLINGUAL
Status: COMPLETED | OUTPATIENT
Start: 2025-02-03 | End: 2025-02-03

## 2025-02-03 RX ORDER — IVABRADINE 5 MG/1
15 TABLET, FILM COATED ORAL ONCE
Status: DISCONTINUED | OUTPATIENT
Start: 2025-02-03 | End: 2025-02-04 | Stop reason: HOSPADM

## 2025-02-03 RX ORDER — METOPROLOL TARTRATE 100 MG/1
100 TABLET ORAL ONCE
Status: DISCONTINUED | OUTPATIENT
Start: 2025-02-03 | End: 2025-02-04 | Stop reason: HOSPADM

## 2025-02-03 RX ORDER — METOPROLOL TARTRATE 25 MG/1
50 TABLET, FILM COATED ORAL
Status: DISCONTINUED | OUTPATIENT
Start: 2025-02-03 | End: 2025-02-04 | Stop reason: HOSPADM

## 2025-02-03 RX ORDER — IOPAMIDOL 755 MG/ML
100 INJECTION, SOLUTION INTRAVASCULAR
Status: COMPLETED | OUTPATIENT
Start: 2025-02-03 | End: 2025-02-03

## 2025-02-03 RX ORDER — SODIUM CHLORIDE 0.9 % (FLUSH) 0.9 %
10 SYRINGE (ML) INJECTION AS NEEDED
Status: DISCONTINUED | OUTPATIENT
Start: 2025-02-03 | End: 2025-02-04 | Stop reason: HOSPADM

## 2025-02-03 RX ORDER — SODIUM CHLORIDE 9 MG/ML
40 INJECTION, SOLUTION INTRAVENOUS AS NEEDED
Status: DISCONTINUED | OUTPATIENT
Start: 2025-02-03 | End: 2025-02-04 | Stop reason: HOSPADM

## 2025-02-03 RX ORDER — SODIUM CHLORIDE 0.9 % (FLUSH) 0.9 %
10 SYRINGE (ML) INJECTION EVERY 12 HOURS SCHEDULED
Status: DISCONTINUED | OUTPATIENT
Start: 2025-02-03 | End: 2025-02-04 | Stop reason: HOSPADM

## 2025-02-03 RX ORDER — METOPROLOL TARTRATE 25 MG/1
25 TABLET, FILM COATED ORAL ONCE
Status: DISCONTINUED | OUTPATIENT
Start: 2025-02-03 | End: 2025-02-04 | Stop reason: HOSPADM

## 2025-02-03 RX ADMIN — IOPAMIDOL 65 ML: 755 INJECTION, SOLUTION INTRAVENOUS at 11:20

## 2025-02-03 RX ADMIN — NITROGLYCERIN 0.8 MG: 0.4 TABLET SUBLINGUAL at 11:00

## 2025-02-03 NOTE — PROGRESS NOTES
Coronary CTA completed today.  This was notable for mid LAD stenosis estimated at greater than 70%.  Discussed with the patient recommendations for heart catheterization.  Discussed the risks and benefits of the procedure.  Recommended scheduling and holding Eliquis for 48 hours prior.  Patient is agreeable to proceed we will get a heart catheterization scheduled.  In the interim I advised her if she does have recurrences of chest pain to come to our ED.

## 2025-02-04 ENCOUNTER — PREP FOR SURGERY (OUTPATIENT)
Dept: OTHER | Facility: HOSPITAL | Age: 80
End: 2025-02-04
Payer: MEDICARE

## 2025-02-04 DIAGNOSIS — R93.1 ABNORMAL FINDINGS DIAGNOSTIC IMAGING OF HEART AND CORONARY CIRCULATION: ICD-10-CM

## 2025-02-04 DIAGNOSIS — I20.0 UNSTABLE ANGINA: Primary | ICD-10-CM

## 2025-02-04 RX ORDER — SODIUM CHLORIDE 0.9 % (FLUSH) 0.9 %
10 SYRINGE (ML) INJECTION AS NEEDED
Status: CANCELLED | OUTPATIENT
Start: 2025-02-04

## 2025-02-04 RX ORDER — SODIUM CHLORIDE 0.9 % (FLUSH) 0.9 %
10 SYRINGE (ML) INJECTION EVERY 12 HOURS SCHEDULED
Status: CANCELLED | OUTPATIENT
Start: 2025-02-04

## 2025-02-04 RX ORDER — SODIUM CHLORIDE 9 MG/ML
40 INJECTION, SOLUTION INTRAVENOUS AS NEEDED
Status: CANCELLED | OUTPATIENT
Start: 2025-02-04

## 2025-02-04 RX ORDER — ASPIRIN 81 MG/1
324 TABLET, CHEWABLE ORAL ONCE
Status: CANCELLED | OUTPATIENT
Start: 2025-02-04 | End: 2025-02-04

## 2025-02-04 RX ORDER — ASPIRIN 81 MG/1
81 TABLET ORAL DAILY
Status: CANCELLED | OUTPATIENT
Start: 2025-02-05

## 2025-02-06 ENCOUNTER — TRANSCRIBE ORDERS (OUTPATIENT)
Dept: CARDIAC REHAB | Facility: HOSPITAL | Age: 80
End: 2025-02-06
Payer: MEDICARE

## 2025-02-06 ENCOUNTER — HOSPITAL ENCOUNTER (OUTPATIENT)
Facility: HOSPITAL | Age: 80
Setting detail: HOSPITAL OUTPATIENT SURGERY
Discharge: HOME | End: 2025-02-06
Attending: INTERNAL MEDICINE | Admitting: INTERNAL MEDICINE
Payer: MEDICARE

## 2025-02-06 VITALS
RESPIRATION RATE: 12 BRPM | HEART RATE: 54 BPM | TEMPERATURE: 97.8 F | OXYGEN SATURATION: 98 % | SYSTOLIC BLOOD PRESSURE: 105 MMHG | DIASTOLIC BLOOD PRESSURE: 72 MMHG

## 2025-02-06 DIAGNOSIS — I20.0 UNSTABLE ANGINA: ICD-10-CM

## 2025-02-06 DIAGNOSIS — Z95.5 STENTED CORONARY ARTERY: Primary | ICD-10-CM

## 2025-02-06 DIAGNOSIS — R93.1 ABNORMAL FINDINGS DIAGNOSTIC IMAGING OF HEART AND CORONARY CIRCULATION: ICD-10-CM

## 2025-02-06 LAB
ANION GAP SERPL CALCULATED.3IONS-SCNC: 14 MMOL/L (ref 5–15)
BUN SERPL-MCNC: 17 MG/DL (ref 8–23)
BUN/CREAT SERPL: 21.3 (ref 7–25)
CALCIUM SPEC-SCNC: 9.7 MG/DL (ref 8.6–10.5)
CHLORIDE SERPL-SCNC: 102 MMOL/L (ref 98–107)
CHOLEST SERPL-MCNC: 149 MG/DL (ref 0–200)
CO2 SERPL-SCNC: 24 MMOL/L (ref 22–29)
CREAT SERPL-MCNC: 0.8 MG/DL (ref 0.57–1)
DEPRECATED RDW RBC AUTO: 49 FL (ref 37–54)
EGFRCR SERPLBLD CKD-EPI 2021: 75.5 ML/MIN/1.73
ERYTHROCYTE [DISTWIDTH] IN BLOOD BY AUTOMATED COUNT: 14.1 % (ref 12.3–15.4)
GLUCOSE SERPL-MCNC: 93 MG/DL (ref 65–99)
HBA1C MFR BLD: 5.3 % (ref 4.8–5.6)
HCT VFR BLD AUTO: 42.5 % (ref 34–46.6)
HDLC SERPL-MCNC: 68 MG/DL (ref 40–60)
HGB BLD-MCNC: 14 G/DL (ref 12–15.9)
LDLC SERPL CALC-MCNC: 65 MG/DL (ref 0–100)
LDLC/HDLC SERPL: 0.93 {RATIO}
MCH RBC QN AUTO: 31 PG (ref 26.6–33)
MCHC RBC AUTO-ENTMCNC: 32.9 G/DL (ref 31.5–35.7)
MCV RBC AUTO: 94 FL (ref 79–97)
PLATELET # BLD AUTO: 229 10*3/MM3 (ref 140–450)
PMV BLD AUTO: 11.3 FL (ref 6–12)
POTASSIUM SERPL-SCNC: 4.4 MMOL/L (ref 3.5–5.2)
RBC # BLD AUTO: 4.52 10*6/MM3 (ref 3.77–5.28)
SODIUM SERPL-SCNC: 140 MMOL/L (ref 136–145)
TRIGL SERPL-MCNC: 88 MG/DL (ref 0–150)
VLDLC SERPL-MCNC: 16 MG/DL (ref 5–40)
WBC NRBC COR # BLD AUTO: 8.44 10*3/MM3 (ref 3.4–10.8)

## 2025-02-06 PROCEDURE — 85347 COAGULATION TIME ACTIVATED: CPT

## 2025-02-06 PROCEDURE — 92978 ENDOLUMINL IVUS OCT C 1ST: CPT | Performed by: INTERNAL MEDICINE

## 2025-02-06 PROCEDURE — C1725 CATH, TRANSLUMIN NON-LASER: HCPCS | Performed by: INTERNAL MEDICINE

## 2025-02-06 PROCEDURE — 83036 HEMOGLOBIN GLYCOSYLATED A1C: CPT | Performed by: HOSPITALIST

## 2025-02-06 PROCEDURE — 93458 L HRT ARTERY/VENTRICLE ANGIO: CPT | Performed by: INTERNAL MEDICINE

## 2025-02-06 PROCEDURE — C1753 CATH, INTRAVAS ULTRASOUND: HCPCS | Performed by: INTERNAL MEDICINE

## 2025-02-06 PROCEDURE — C1874 STENT, COATED/COV W/DEL SYS: HCPCS | Performed by: INTERNAL MEDICINE

## 2025-02-06 PROCEDURE — 80048 BASIC METABOLIC PNL TOTAL CA: CPT | Performed by: HOSPITALIST

## 2025-02-06 PROCEDURE — C1887 CATHETER, GUIDING: HCPCS | Performed by: INTERNAL MEDICINE

## 2025-02-06 PROCEDURE — 25810000003 SODIUM CHLORIDE 0.9 % SOLUTION: Performed by: INTERNAL MEDICINE

## 2025-02-06 PROCEDURE — 80061 LIPID PANEL: CPT | Performed by: HOSPITALIST

## 2025-02-06 PROCEDURE — 93005 ELECTROCARDIOGRAM TRACING: CPT | Performed by: INTERNAL MEDICINE

## 2025-02-06 PROCEDURE — 25010000002 HEPARIN (PORCINE) PER 1000 UNITS: Performed by: INTERNAL MEDICINE

## 2025-02-06 PROCEDURE — C9600 PERC DRUG-EL COR STENT SING: HCPCS | Performed by: INTERNAL MEDICINE

## 2025-02-06 PROCEDURE — C1769 GUIDE WIRE: HCPCS | Performed by: INTERNAL MEDICINE

## 2025-02-06 PROCEDURE — 25810000003 SODIUM CHLORIDE 0.9 % SOLUTION: Performed by: HOSPITALIST

## 2025-02-06 PROCEDURE — 25010000002 MIDAZOLAM PER 1 MG: Performed by: INTERNAL MEDICINE

## 2025-02-06 PROCEDURE — 36415 COLL VENOUS BLD VENIPUNCTURE: CPT

## 2025-02-06 PROCEDURE — 25010000002 FENTANYL CITRATE (PF) 50 MCG/ML SOLUTION: Performed by: INTERNAL MEDICINE

## 2025-02-06 PROCEDURE — 92928 PRQ TCAT PLMT NTRAC ST 1 LES: CPT | Performed by: INTERNAL MEDICINE

## 2025-02-06 PROCEDURE — 25510000001 IOPAMIDOL PER 1 ML: Performed by: INTERNAL MEDICINE

## 2025-02-06 PROCEDURE — 25010000002 NICARDIPINE 2.5 MG/ML SOLUTION: Performed by: INTERNAL MEDICINE

## 2025-02-06 PROCEDURE — C1894 INTRO/SHEATH, NON-LASER: HCPCS | Performed by: INTERNAL MEDICINE

## 2025-02-06 PROCEDURE — 85027 COMPLETE CBC AUTOMATED: CPT | Performed by: HOSPITALIST

## 2025-02-06 PROCEDURE — 25010000002 LIDOCAINE PF 1% 1 % SOLUTION: Performed by: INTERNAL MEDICINE

## 2025-02-06 DEVICE — XIENCE SKYPOINT™ EVEROLIMUS ELUTING CORONARY STENT SYSTEM 2.75 MM X 28 MM / RAPID-EXCHANGE
Type: IMPLANTABLE DEVICE | Site: CORONARY | Status: FUNCTIONAL
Brand: XIENCE SKYPOINT™

## 2025-02-06 RX ORDER — ASPIRIN 81 MG/1
81 TABLET ORAL DAILY
Status: DISCONTINUED | OUTPATIENT
Start: 2025-02-07 | End: 2025-02-06 | Stop reason: HOSPADM

## 2025-02-06 RX ORDER — SODIUM CHLORIDE 9 MG/ML
40 INJECTION, SOLUTION INTRAVENOUS AS NEEDED
Status: DISCONTINUED | OUTPATIENT
Start: 2025-02-06 | End: 2025-02-06 | Stop reason: HOSPADM

## 2025-02-06 RX ORDER — SODIUM CHLORIDE 0.9 % (FLUSH) 0.9 %
10 SYRINGE (ML) INJECTION EVERY 12 HOURS SCHEDULED
Status: DISCONTINUED | OUTPATIENT
Start: 2025-02-06 | End: 2025-02-06 | Stop reason: HOSPADM

## 2025-02-06 RX ORDER — ACETAMINOPHEN 325 MG/1
650 TABLET ORAL EVERY 4 HOURS PRN
Status: DISCONTINUED | OUTPATIENT
Start: 2025-02-06 | End: 2025-02-06 | Stop reason: HOSPADM

## 2025-02-06 RX ORDER — SODIUM CHLORIDE 0.9 % (FLUSH) 0.9 %
10 SYRINGE (ML) INJECTION AS NEEDED
Status: DISCONTINUED | OUTPATIENT
Start: 2025-02-06 | End: 2025-02-06 | Stop reason: HOSPADM

## 2025-02-06 RX ORDER — ROSUVASTATIN CALCIUM 20 MG/1
20 TABLET, COATED ORAL DAILY
Qty: 90 TABLET | Refills: 3 | Status: SHIPPED | OUTPATIENT
Start: 2025-02-06

## 2025-02-06 RX ORDER — ASPIRIN 81 MG/1
324 TABLET, CHEWABLE ORAL ONCE
Status: COMPLETED | OUTPATIENT
Start: 2025-02-06 | End: 2025-02-06

## 2025-02-06 RX ORDER — ASPIRIN 81 MG/1
81 TABLET ORAL DAILY
Start: 2025-02-06 | End: 2025-03-08

## 2025-02-06 RX ORDER — NITROGLYCERIN 0.4 MG/1
0.4 TABLET SUBLINGUAL
Status: DISCONTINUED | OUTPATIENT
Start: 2025-02-06 | End: 2025-02-06 | Stop reason: HOSPADM

## 2025-02-06 RX ORDER — CLOPIDOGREL BISULFATE 75 MG/1
TABLET ORAL
Status: DISCONTINUED | OUTPATIENT
Start: 2025-02-06 | End: 2025-02-06 | Stop reason: HOSPADM

## 2025-02-06 RX ORDER — IOPAMIDOL 755 MG/ML
INJECTION, SOLUTION INTRAVASCULAR
Status: DISCONTINUED | OUTPATIENT
Start: 2025-02-06 | End: 2025-02-06 | Stop reason: HOSPADM

## 2025-02-06 RX ORDER — LIDOCAINE HYDROCHLORIDE 10 MG/ML
INJECTION, SOLUTION EPIDURAL; INFILTRATION; INTRACAUDAL; PERINEURAL
Status: DISCONTINUED | OUTPATIENT
Start: 2025-02-06 | End: 2025-02-06 | Stop reason: HOSPADM

## 2025-02-06 RX ORDER — MIDAZOLAM HYDROCHLORIDE 1 MG/ML
INJECTION, SOLUTION INTRAMUSCULAR; INTRAVENOUS
Status: DISCONTINUED | OUTPATIENT
Start: 2025-02-06 | End: 2025-02-06 | Stop reason: HOSPADM

## 2025-02-06 RX ORDER — FENTANYL CITRATE 50 UG/ML
INJECTION, SOLUTION INTRAMUSCULAR; INTRAVENOUS
Status: DISCONTINUED | OUTPATIENT
Start: 2025-02-06 | End: 2025-02-06 | Stop reason: HOSPADM

## 2025-02-06 RX ORDER — HEPARIN SODIUM 1000 [USP'U]/ML
INJECTION, SOLUTION INTRAVENOUS; SUBCUTANEOUS
Status: DISCONTINUED | OUTPATIENT
Start: 2025-02-06 | End: 2025-02-06 | Stop reason: HOSPADM

## 2025-02-06 RX ORDER — CLOPIDOGREL BISULFATE 75 MG/1
75 TABLET ORAL DAILY
Qty: 90 TABLET | Refills: 1 | Status: SHIPPED | OUTPATIENT
Start: 2025-02-06

## 2025-02-06 RX ADMIN — SODIUM CHLORIDE 199.2 ML: 9 INJECTION, SOLUTION INTRAVENOUS at 06:50

## 2025-02-06 RX ADMIN — ASPIRIN 324 MG: 81 TABLET, CHEWABLE ORAL at 06:50

## 2025-02-06 NOTE — NURSING NOTE
Pt. Referred for Phase II Cardiac Rehab. Staff discussed benefits of exercise, program protocol, and educational material provided. Teach back verified.  Patient scheduled for orientation at [facility] on February 14th at 1:00 PM.

## 2025-02-06 NOTE — Clinical Note
First balloon inflation max pressure = 18 gayle. First balloon inflation duration = 10 seconds. Second inflation of balloon - Max pressure = 18 gayle. 2nd Inflation of balloon - Duration = 10 seconds. Third inflation of balloon - Max pressure = 20 gayle. 3rd Inflation of balloon - Duration = 10 seconds. Fourth inflation of balloon - Max pressure = 20 gayle. 4th Inflation of balloon - Duration = 15 seconds.

## 2025-02-06 NOTE — Clinical Note
Hemostasis started on the right radial artery. R-Band was used in achieving hemostasis. Radial compression device applied to vessel. Hemostasis achieved successfully. Closure device additional comment: 11

## 2025-02-06 NOTE — INTERVAL H&P NOTE
H&P reviewed. The patient was examined and there are no changes to the H&P.  Patient with recent chest pain that radiates to her arms bilaterally.  Underwent coronary CTA that was notable for 70% LAD stenosis.  She presents today for left heart catheterization +/- PCI via right radial approach with Dr. Walker.  Right radial Barbeau type a.  Labs reviewed.  Last dose of Eliquis was on the morning of 2/04/2025.  The risks, benefits, and alternatives of the procedure have been reviewed and the patient wishes to proceed.  Further recommendations to follow procedure.     Electronically signed by JOHN River, 02/06/25, 7:38 AM EST.

## 2025-02-07 ENCOUNTER — CALL CENTER PROGRAMS (OUTPATIENT)
Dept: CALL CENTER | Facility: HOSPITAL | Age: 80
End: 2025-02-07
Payer: MEDICARE

## 2025-02-07 LAB
ACT BLD: 279 SECONDS (ref 82–152)
QT INTERVAL: 416 MS
QTC INTERVAL: 411 MS

## 2025-02-07 NOTE — OUTREACH NOTE
PCI/Device Survey      Flowsheet Row Responses   Facility patient discharged from? [place]   Procedure date 02/06/25   Procedure (if device, specify in description) PCI   PCI site Right, Arm   Performing MD Dr. Amador Walker   Performing MD --  [Dr. Storm]   Attempt successful? Yes   Call start time [id number]   Call end time [id number]   Symptom comments Pt c/o sorethroat this morning. This nurse and the pt discussed options for soothing throat remedies. Pt denies SOA or chest pain.   Is the patient taking prescribed medications: Apixaban, ASA, Plavix   Nursing intervention Reminded to continue to take prescribed medications, Nurse provided patient education   Does the patient have any of the following symptoms related to the cath/surgical site? Bruising  [Right radial site with dressing remains intact, pt reports no drainage, some bruising noted.]   Nursing intervention Patient education provided   Does the patient have an appointment scheduled with the cardiologist? Yes   If the patient is a current smoker, are they able to teach back resources for cessation? Not a smoker   Did the patient feel prepared to go home on the same day as the procedure? Yes   Is the patient satisfied with the same day discharge process? Yes   PCI/Device call completed Yes            Barby MILIAN - Radha Nurse

## 2025-02-13 ENCOUNTER — TREATMENT (OUTPATIENT)
Dept: CARDIAC REHAB | Facility: HOSPITAL | Age: 80
End: 2025-02-13
Payer: MEDICARE

## 2025-02-13 DIAGNOSIS — Z95.5 STENTED CORONARY ARTERY: ICD-10-CM

## 2025-02-13 PROCEDURE — 93798 PHYS/QHP OP CAR RHAB W/ECG: CPT

## 2025-02-18 ENCOUNTER — TRANSCRIBE ORDERS (OUTPATIENT)
Dept: ADMINISTRATIVE | Facility: HOSPITAL | Age: 80
End: 2025-02-18
Payer: MEDICARE

## 2025-02-18 DIAGNOSIS — R07.9 RECURRENT CHEST PAIN: Primary | ICD-10-CM

## 2025-02-20 ENCOUNTER — TELEPHONE (OUTPATIENT)
Dept: CARDIAC REHAB | Facility: HOSPITAL | Age: 80
End: 2025-02-20
Payer: MEDICARE

## 2025-02-20 ENCOUNTER — APPOINTMENT (OUTPATIENT)
Dept: CARDIAC REHAB | Facility: HOSPITAL | Age: 80
End: 2025-02-20
Payer: MEDICARE

## 2025-02-21 ENCOUNTER — TREATMENT (OUTPATIENT)
Dept: CARDIAC REHAB | Facility: HOSPITAL | Age: 80
End: 2025-02-21
Payer: MEDICARE

## 2025-02-21 DIAGNOSIS — Z95.5 STENTED CORONARY ARTERY: Primary | ICD-10-CM

## 2025-02-21 PROCEDURE — 93798 PHYS/QHP OP CAR RHAB W/ECG: CPT

## 2025-02-24 ENCOUNTER — TREATMENT (OUTPATIENT)
Dept: CARDIAC REHAB | Facility: HOSPITAL | Age: 80
End: 2025-02-24
Payer: MEDICARE

## 2025-02-24 DIAGNOSIS — Z95.5 STENTED CORONARY ARTERY: Primary | ICD-10-CM

## 2025-02-24 PROCEDURE — 93798 PHYS/QHP OP CAR RHAB W/ECG: CPT

## 2025-02-24 NOTE — ASSESSMENT & PLAN NOTE
DEXA 10/15/2021 largely unchanged from previous in 2019.  She still has osteoporosis in the right femoral neck (-2.5) and osteopenia of left femoral neck and lumbar spine  DEXA 1/10/23: Left femoral neck -2.4 and spine -1.9    - Continue 600 mg calcium daily and 2000 IU vitamin D3 daily.  - Continue Prolia every 6 months at [facility].  She does not remember when last dose was.  We are requesting records.  - Will recheck calcium and Vitamin D prior to next Prolia.  - Repeat DEXA 1/2026    - Risk benefits of bisphosphonates/Prolia discussed in detail including but not limited to osteonecrosis jaw, atypical femoral fracture, bone death, kidney failure, hypocalcemia.

## 2025-02-24 NOTE — ASSESSMENT & PLAN NOTE
- Continue follow-up with primary care for management of anxiety and depression. She is currently on bupropion and buspirone.  - She has completed PT  - Encouraged daily walking.  - Consider pain management referral.  - Will consider trying a muscle relaxer. They are hesitant to add anything due to side effects.  - I have encouraged regular low impact aerobic exercise up to 30 minutes per day. If this is unattainable, recommend a graded exercise program starting with 5 minutes of dedicated cardiovascular exercise daily, increasing by 1 minute daily until goal of 30 minutes daily is reached.  - I have recommended conservative measures to improve sleep.  - Continue follow-up with primary care for medical management.  - Recommend avoiding narcotics studies have shown that narcotics can worsen fibromyalgia pain.   - Counseled on alternative therapies that can help with pain including massage therapy, aquatic therapy, physical therapy, acupuncture, chiropractics, and psychology evaluation.  - She reports fibromyalgia is intermittent.  Back pain overrides fibromyalgia pain at time.  - She does not want to try aqua therapy.   - She has had more right arm and thigh muscle pain.  She has had an increase in Crestor and she has started cardiac therapy.  These can also cause an increase in muscle pain.

## 2025-02-24 NOTE — ASSESSMENT & PLAN NOTE
Positive DARLING 1:160, negative dsDNA, negative SHARDA (2012)  Negative DARLING, normal complements, CRP normal (04/2013)  Lumbar x-ray showed osteoarthritis of the lumbar spine (2023)    - She cannot take NSAIDs due to anticoagulation.  - she is now on Plavix after having a cardiac stent placed  - She has had chronic back and neck pain for years.  She uses salon pas patches which are helpful.  Voltaren gel is helpful.  - She has intermittent medial knee pain.  - She has right foot pain. She has seen podiatry.  She has had no relief with foot pain.  This is aggravated with cardiac rehab.  - She has been told she has flat feet.  She has tried arch supports (1600.00 from the [employer]) with no relief.  Special tennis shoes were too heavy.  - Consider referral to foot ortho next visit.  - She has bilateral CMC joint pain intermittently.  - She rarely uses Tramadol. She does not find it very helpful.  - Trial TENS unit  - She has started cardiac rehab

## 2025-02-26 ENCOUNTER — TREATMENT (OUTPATIENT)
Dept: CARDIAC REHAB | Facility: HOSPITAL | Age: 80
End: 2025-02-26
Payer: MEDICARE

## 2025-02-26 ENCOUNTER — TELEPHONE (OUTPATIENT)
Age: 80
End: 2025-02-26

## 2025-02-26 ENCOUNTER — OFFICE VISIT (OUTPATIENT)
Age: 80
End: 2025-02-26
Payer: MEDICARE

## 2025-02-26 VITALS
BODY MASS INDEX: 24.41 KG/M2 | DIASTOLIC BLOOD PRESSURE: 78 MMHG | HEART RATE: 71 BPM | HEIGHT: 64 IN | TEMPERATURE: 97.2 F | WEIGHT: 143 LBS | SYSTOLIC BLOOD PRESSURE: 120 MMHG

## 2025-02-26 DIAGNOSIS — M81.0 OSTEOPOROSIS, UNSPECIFIED OSTEOPOROSIS TYPE, UNSPECIFIED PATHOLOGICAL FRACTURE PRESENCE: ICD-10-CM

## 2025-02-26 DIAGNOSIS — M15.0 PRIMARY OSTEOARTHRITIS INVOLVING MULTIPLE JOINTS: Primary | ICD-10-CM

## 2025-02-26 DIAGNOSIS — Z95.5 STENTED CORONARY ARTERY: Primary | ICD-10-CM

## 2025-02-26 DIAGNOSIS — M79.7 FIBROMYALGIA: ICD-10-CM

## 2025-02-26 PROCEDURE — 93798 PHYS/QHP OP CAR RHAB W/ECG: CPT

## 2025-02-26 RX ORDER — OLMESARTAN MEDOXOMIL 40 MG/1
TABLET ORAL
COMMUNITY
Start: 2025-02-11

## 2025-02-26 RX ORDER — METOPROLOL SUCCINATE 50 MG/1
TABLET, EXTENDED RELEASE ORAL
COMMUNITY
Start: 2025-02-10

## 2025-02-26 NOTE — PROGRESS NOTES
Office Visit       Date: 02/26/2025   Patient Name: Marilyn Bernal  MRN: [id number]  YOB: 1945    Referring Physician: No ref. provider found     Chief Complaint:   Chief Complaint   Patient presents with    Fibromyalgia       History of Present Illness: Marilyn Bernal is a 79 y.o. female who is here today for follow-up of fibromyalgia and osteoporosis.  Today she rates her pain 9 out of 10 and has 5 minutes of morning stiffness.  She reports feeling the same.  She has been started on Plavix since we last saw her.  She had a a cardiac stent 3 weeks ago and an increase in the rosuvastatin.      Subjective   Review of Systems:  Positive for appetite change, fatigue, hearing loss, mouth sores, sneezing, sore throat, ringing in the ears, dry mouth, eye pain, abnormal sensitivity to light, chest tightness, cough, chest pain, constipation, diarrhea, joint pain, back pain, muscle pain, neck stiffness, bruising, depressed mood, urinary incontinence.  All other review of symptoms are negative.    Past Medical History:   Past Medical History:   Diagnosis Date    Abnormal gait     Reported by patient-slower, smaller steps    Anxiety     Arrhythmia 2014    Arthritis     Broken arm     right    Broken bones     pelvis    Broken ribs     Cataracts, bilateral     Deep vein thrombosis     Depression     Fibromyalgia, primary     Fracture     HL (hearing loss)     Hyperlipidemia     Hypertension     IBS (irritable bowel syndrome)     Insomnia     Migraine     Osteoporosis     Stroke        Past Surgical History:   Past Surgical History:   Procedure Laterality Date    CARDIAC CATHETERIZATION N/A 2/6/2025    Procedure: Left Heart Cath - Right radial access;  Surgeon: Amador Walker MD;  Location:  [facility] C[facility] INVASIVE [facility];  Service: Cardiovascular;  Laterality: N/A;    CARDIAC CATHETERIZATION N/A 2/6/2025    Procedure: Optical Coherence Tomography;  Surgeon: Amador Walker MD;  [facility];  Service: Cardiovascular;  Laterality: N/A;    CARDIAC CATHETERIZATION N/A 2/6/2025    Procedure: Stent CHAD coronary;  Surgeon: Amador Walker MD;  Location: [facility] CATH INVASIVE LOCATION;  Service: Cardiovascular;  Laterality: N/A;    CATARACT EXTRACTION Bilateral     LIPOMA EXCISION      Removal-1984 in [facility]    MIDDLE EAR SURGERY  1967    [place], [place]       Family History:   Family History   Problem Relation Age of Onset    Cancer Mother     Osteoporosis Mother     Stroke Mother     Glaucoma Mother     Colon cancer Mother     Heart attack Father     Asthma Sister     Migraines Son        Social History:   Social History     Socioeconomic History    Marital status:    Tobacco Use    Smoking status: Never    Smokeless tobacco: Never   Vaping Use    Vaping status: Never Used   Substance and Sexual Activity    Alcohol use: Yes     Alcohol/week: 10.0 standard drinks of alcohol     Types: 5 Glasses of wine, 5 Standard drinks or equivalent per week     Comment: 1 glass of wine per day    Drug use: Never    Sexual activity: Not Currently     Partners: Male       Medications:   Current Outpatient Medications:     aspirin 81 MG EC tablet, Take 1 tablet by mouth Daily for 30 days., Disp: , Rfl:     busPIRone (BUSPAR) 10 MG tablet, Take 1 tablet by mouth 3 (Three) Times a Day., Disp: , Rfl:     calcium carbonate-vitamin d 600-400 MG-UNIT per tablet, Take 1 tablet by mouth Daily., Disp: , Rfl:     chlorhexidine (PERIDEX) 0.12 % solution, RINSE AND GARGLE 15 ML BY MOUTH TWICE DAILY, Disp: , Rfl:     cholecalciferol (VITAMIN D3) 25 MCG (1000 UT) tablet, Take 1 tablet by mouth Daily., Disp: , Rfl:     clopidogrel (PLAVIX) 75 MG tablet, Take 1 tablet by mouth Daily., Disp: 90 tablet, Rfl: 1    denosumab (Prolia) 60 MG/ML solution prefilled syringe syringe, Inject 1 mL under the skin into the appropriate area as directed Every 6 (Six) Months., Disp: , Rfl:     DULoxetine (CYMBALTA) 20  "MG capsule, Take 1 capsule by mouth Daily., Disp: , Rfl:     Eliquis 5 MG tablet tablet, Take 1 tablet by mouth 2 (Two) Times a Day., Disp: , Rfl:     folic acid (FOLVITE) 1 MG tablet, Take 1 tablet by mouth Daily., Disp: , Rfl:     metoprolol succinate XL (TOPROL-XL) 50 MG 24 hr tablet, take 1 tablet by mouth daily, Disp: , Rfl:     multivitamin with minerals (Centrum Silver 50+Women) tablet tablet, Take 1 tablet by mouth Daily., Disp: , Rfl:     nitroglycerin (NITROSTAT) 0.4 MG SL tablet, 1 under the tongue as needed for angina, may repeat q5mins for up three doses, Disp: 30 tablet, Rfl: 6    NON FORMULARY, SWISH WITH 1 TEASPOON (5ML) BY MOUTH 3-4 TIMES A DAY AS NEEDED, FOR 2 MINUTES, THEN EXPECTORATE. DO NOT SWALLOW., Disp: , Rfl:     olmesartan (BENICAR) 40 MG tablet, take 1 tablet by mouth daily, Disp: , Rfl:     rosuvastatin (CRESTOR) 20 MG tablet, Take 1 tablet by mouth Daily., Disp: 90 tablet, Rfl: 3    saccharomyces boulardii (FLORASTOR) 250 MG capsule, Take 1 capsule by mouth 2 (Two) Times a Day., Disp: , Rfl:     timolol (TIMOPTIC) 0.5 % ophthalmic solution, Administer 1 drop to both eyes Daily., Disp: , Rfl:     traMADol (ULTRAM) 50 MG tablet, Take 1 tablet by mouth Every 6 (Six) Hours As Needed., Disp: , Rfl:     zolpidem CR (AMBIEN CR) 12.5 MG CR tablet, Take 1 tablet by mouth Every Night., Disp: , Rfl:     Allergies: No Known Allergies    I have reviewed and updated the patient's chief complaint, history of present illness, review of systems, past medical history, surgical history, family history, social history, medications and allergy list as appropriate.     Objective    Vital Signs:   Vitals:    02/26/25 1015   BP: 120/78   BP Location: Left arm   Patient Position: Sitting   Cuff Size: Adult   Pulse: 71   Temp: 97.2 °F (36.2 °C)   Weight: 64.9 kg (143 lb)   Height: 162.6 cm (64.02\")   PainSc: 9      Body mass index is 24.53 kg/m².           Physical Exam:  Physical Exam  Vitals reviewed. "   Constitutional:       Appearance: Normal appearance.   HENT:      Head: Normocephalic and atraumatic.      Mouth/Throat:      Mouth: Mucous membranes are moist.   Eyes:      Conjunctiva/sclera: Conjunctivae normal.   Cardiovascular:      Rate and Rhythm: Normal rate and regular rhythm.      Pulses: Normal pulses.      Heart sounds: Normal heart sounds.   Pulmonary:      Effort: Pulmonary effort is normal.      Breath sounds: Normal breath sounds.   Musculoskeletal:         General: Normal range of motion.      Cervical back: Normal range of motion and neck supple.      Comments: Using rollator  No soft tissue swelling  OA changes bilateral sofya  Crepitus right knee  Tender lumbar spine  Myofascial tender points present above and below the waist   Skin:     General: Skin is warm and dry.      Findings: Bruising present.      Comments: Quarter sized flat, erythematous patch on left elbow   Neurological:      General: No focal deficit present.      Mental Status: She is alert and oriented to person, place, and time. Mental status is at baseline.   Psychiatric:         Mood and Affect: Mood normal.         Behavior: Behavior normal.         Thought Content: Thought content normal.         Judgment: Judgment normal.          Results Review:   Imaging Results (Last 24 Hours)       ** No results found for the last 24 hours. **            Procedures    Assessment / Plan    Assessment/Plan:   Diagnoses and all orders for this visit:    1. Primary osteoarthritis involving multiple joints (Primary)  Assessment & Plan:  Positive DARLING 1:160, negative dsDNA, negative SHARDA (2012)  Negative DARLING, normal complements, CRP normal (04/2013)  Lumbar x-ray showed osteoarthritis of the lumbar spine (2023)    - She cannot take NSAIDs due to anticoagulation.  - she is now on Plavix after having a cardiac stent placed  - She has had chronic back and neck pain for years.  She uses salon pas patches which are helpful.  Voltaren gel is  helpful.  - She has intermittent medial knee pain.  - She has right foot pain. She has seen podiatry.  She has had no relief with foot pain.  This is aggravated with cardiac rehab.  - She has been told she has flat feet.  She has tried arch supports (1600.00 from the [employer]) with no relief.  Special tennis shoes were too heavy.  - Consider referral to foot ortho next visit.  - She has bilateral CMC joint pain intermittently.  - She rarely uses Tramadol. She does not find it very helpful.  - Trial TENS unit  - She has started cardiac rehab      2. Osteoporosis, unspecified osteoporosis type, unspecified pathological fracture presence  Assessment & Plan:  DEXA 10/15/2021 largely unchanged from previous in 2019.  She still has osteoporosis in the right femoral neck (-2.5) and osteopenia of left femoral neck and lumbar spine  DEXA 1/10/23: Left femoral neck -2.4 and spine -1.9    - Continue 600 mg calcium daily and 2000 IU vitamin D3 daily.  - Continue Prolia every 6 months at [facility].  She does not remember when last dose was.  We are requesting records.  - Will recheck calcium and Vitamin D prior to next Prolia.  - Repeat DEXA 1/2026    - Risk benefits of bisphosphonates/Prolia discussed in detail including but not limited to osteonecrosis jaw, atypical femoral fracture, bone death, kidney failure, hypocalcemia.      3. Fibromyalgia  Assessment & Plan:  - Continue follow-up with primary care for management of anxiety and depression. She is currently on bupropion and buspirone.  - She has completed PT  - Encouraged daily walking.  - Consider pain management referral.  - Will consider trying a muscle relaxer. They are hesitant to add anything due to side effects.  - I have encouraged regular low impact aerobic exercise up to 30 minutes per day. If this is unattainable, recommend a graded exercise program starting with 5 minutes of dedicated cardiovascular exercise daily, increasing by 1 minute daily until  goal of 30 minutes daily is reached.  - I have recommended conservative measures to improve sleep.  - Continue follow-up with primary care for medical management.  - Recommend avoiding narcotics studies have shown that narcotics can worsen fibromyalgia pain.   - Counseled on alternative therapies that can help with pain including massage therapy, aquatic therapy, physical therapy, acupuncture, chiropractics, and psychology evaluation.  - She reports fibromyalgia is intermittent.  Back pain overrides fibromyalgia pain at time.  - She does not want to try aqua therapy.   - She has had more right arm and thigh muscle pain.  She has had an increase in Crestor and she has started cardiac therapy.  These can also cause an increase in muscle pain.          Follow Up:   Return in about 6 months (around 8/26/2025) for JOHN Page, JOHN Johnson.        JOHN Thomas  [facility]

## 2025-02-26 NOTE — TELEPHONE ENCOUNTER
Called [facility] at [phone] and requested treatment notes.  Last 2 visit were March 12 2024 and Sept 12 2023.

## 2025-02-28 ENCOUNTER — TREATMENT (OUTPATIENT)
Dept: CARDIAC REHAB | Facility: HOSPITAL | Age: 80
End: 2025-02-28
Payer: MEDICARE

## 2025-02-28 ENCOUNTER — APPOINTMENT (OUTPATIENT)
Dept: CARDIAC REHAB | Facility: HOSPITAL | Age: 80
End: 2025-02-28
Payer: MEDICARE

## 2025-02-28 DIAGNOSIS — Z95.5 STENTED CORONARY ARTERY: Primary | ICD-10-CM

## 2025-02-28 PROCEDURE — 93798 PHYS/QHP OP CAR RHAB W/ECG: CPT

## 2025-03-03 ENCOUNTER — TREATMENT (OUTPATIENT)
Dept: CARDIAC REHAB | Facility: HOSPITAL | Age: 80
End: 2025-03-03
Payer: MEDICARE

## 2025-03-03 ENCOUNTER — APPOINTMENT (OUTPATIENT)
Dept: CARDIAC REHAB | Facility: HOSPITAL | Age: 80
End: 2025-03-03
Payer: MEDICARE

## 2025-03-03 DIAGNOSIS — Z95.5 STENTED CORONARY ARTERY: Primary | ICD-10-CM

## 2025-03-03 PROCEDURE — 93798 PHYS/QHP OP CAR RHAB W/ECG: CPT

## 2025-03-05 ENCOUNTER — TREATMENT (OUTPATIENT)
Dept: CARDIAC REHAB | Facility: HOSPITAL | Age: 80
End: 2025-03-05
Payer: MEDICARE

## 2025-03-05 ENCOUNTER — APPOINTMENT (OUTPATIENT)
Dept: CARDIAC REHAB | Facility: HOSPITAL | Age: 80
End: 2025-03-05
Payer: MEDICARE

## 2025-03-05 DIAGNOSIS — Z95.5 STENTED CORONARY ARTERY: Primary | ICD-10-CM

## 2025-03-05 PROCEDURE — 93798 PHYS/QHP OP CAR RHAB W/ECG: CPT

## 2025-03-07 ENCOUNTER — APPOINTMENT (OUTPATIENT)
Dept: CARDIAC REHAB | Facility: HOSPITAL | Age: 80
End: 2025-03-07
Payer: MEDICARE

## 2025-03-07 ENCOUNTER — TREATMENT (OUTPATIENT)
Dept: CARDIAC REHAB | Facility: HOSPITAL | Age: 80
End: 2025-03-07
Payer: MEDICARE

## 2025-03-07 DIAGNOSIS — Z95.5 STENTED CORONARY ARTERY: Primary | ICD-10-CM

## 2025-03-07 PROCEDURE — 93798 PHYS/QHP OP CAR RHAB W/ECG: CPT

## 2025-03-10 ENCOUNTER — APPOINTMENT (OUTPATIENT)
Dept: CARDIAC REHAB | Facility: HOSPITAL | Age: 80
End: 2025-03-10
Payer: MEDICARE

## 2025-03-10 ENCOUNTER — TREATMENT (OUTPATIENT)
Dept: CARDIAC REHAB | Facility: HOSPITAL | Age: 80
End: 2025-03-10
Payer: MEDICARE

## 2025-03-10 DIAGNOSIS — Z95.5 STENTED CORONARY ARTERY: Primary | ICD-10-CM

## 2025-03-10 PROCEDURE — 93798 PHYS/QHP OP CAR RHAB W/ECG: CPT

## 2025-03-12 ENCOUNTER — APPOINTMENT (OUTPATIENT)
Dept: CARDIAC REHAB | Facility: HOSPITAL | Age: 80
End: 2025-03-12
Payer: MEDICARE

## 2025-03-12 ENCOUNTER — TREATMENT (OUTPATIENT)
Dept: CARDIAC REHAB | Facility: HOSPITAL | Age: 80
End: 2025-03-12
Payer: MEDICARE

## 2025-03-12 DIAGNOSIS — Z95.5 STENTED CORONARY ARTERY: Primary | ICD-10-CM

## 2025-03-12 PROCEDURE — 93798 PHYS/QHP OP CAR RHAB W/ECG: CPT

## 2025-03-12 PROCEDURE — 93797 PHYS/QHP OP CAR RHAB WO ECG: CPT

## 2025-03-12 NOTE — PROGRESS NOTES
"  NUTRITION ASSESSMENT & EDUCATION  CARDIAC/PULMONARY REHAB      Appointment Date and Time: 03/12/25, 07:49 EDT  Patient Name: Marilyn Bernal   Age: 79 y.o.     Sex:  female      Employment/Activity Level:   Marilyn's education level: MS  Occupation:  retired - Teacher  & former  at [employer]                        Job Activity Level: N/A  Routine Exercise: no routine exercise. Has had PT in past to increase balance (former pelvic frx).                                     Weight Assessment:   Height:   Ht Readings from Last 1 Encounters:   02/26/25 162.6 cm (64.02\")     Weight:   Wt Readings from Last 1 Encounters:   02/26/25 64.9 kg (143 lb)         BMI:    BMI Readings from Last 1 Encounters:   02/26/25 24.53 kg/m²       -------------------------------------------------------------------------------------------------  IBW: Ideal body weight: 54.7 kg (120 lb 10.8 oz)  Adjusted ideal body weight: 58.8 kg (129 lb 9.7 oz)  -------------------------------------------------------------------------------------------------  Weight Assessment: Normal  Weight Change last six months: relatively stable weight        Usual Weight: 143 lb       Desired Weight: current weight is appropriate    Cardiac Risk Factors:         Atherosclerotic heart disease       Hyperlipidemia/hypercholesterolemia       Hypertension      Cataracts, osteoporosis, anxiety, arthritis, hearing loss, IBS, balance d/o, unstable angina    Pertinent Lab Values:     Total Cholesterol   Date Value Ref Range Status   02/06/2025 149 0 - 200 mg/dL Final     HDL Cholesterol   Date Value Ref Range Status   02/06/2025 68 (H) 40 - 60 mg/dL Final     LDL Cholesterol    Date Value Ref Range Status   02/06/2025 65 0 - 100 mg/dL Final     LDL/HDL Ratio   Date Value Ref Range Status   02/06/2025 0.93  Final     Triglycerides   Date Value Ref Range Status   02/06/2025 88 0 - 150 mg/dL Final     Hemoglobin A1C   Date Value Ref Range Status   02/06/2025 5.30 4.80 " - 5.60 % Final     Glucose   Date Value Ref Range Status   02/06/2025 93 65 - 99 mg/dL Final     Labs reviewed with Pt today    Pertinent Medications:   Nutritional Supplements: reviewed  Pertinent Nutrition-Related Medications: Reviewed - no changes recommended at this time.  Self Identified Problems or Concerns:   Love nuts and have been eating more. Formerly saltaholic and sugar excessed.     Nutrition Influences:   Appetite: good            Taste/smell changes:  No  Factors limiting PO intake: none    Food records reviewed?: Yes, completed review of 'Rate Your Plate' score and tallies.  Extensive review and discussion of home diet today.    Marilyn lives with:  Michi Sanders receives lifestyle support from others at home?: Yes  Spouse/significant other present for diet instruction today?: Yes    Diet Assessment:   Diet Survey Score: pending completion at this time.  Meal intake pattern:  2 meals per day  Dining out:  occasional, < 1/mo-               Fast food:  rare    24 hour recall:   B grapes, coffee   L tuna sandwich, fruit, water   D brisket, chicken, fish and vegetable, water, nuts, chocolate, bourbon    Who does the grocery shopping:  Michi  Who does the cooking at home:  Michi                     Home diet review:  Generally Heart Healthy Fat intake, has made changes toward American Heart Association guidelines, Moderate-acceptable Sodium intake, aware of sodium guidelines and strives to reduce intake, Frequent use of Commercially Prepared foods- potential for excess sodium, saturated fats, trans fats, Strives for a Heart Healthy Diet, Has recently made positive changes toward AHA nutrition guidelines, and Diet includes several of the AHA recommendations of olive oil, nuts, beans, fish/poultry, minimal added sugars    Motivation level toward diet compliance:  moderate  Assessment / Recommendations:   Prior diet education before to coming to Cardiac Rehab?: No  Instructed provided on:  American  Heart Association 2021 Nutrition Guidelines, Saturated Fat, Picking Healthy Proteins, Get the Scoop on Sodium/Salt <2300 mg/d, Added Sugars Guidance, Go Nuts for Heart Health, [place] 3 fats in Fish & Seafood, Dining Out Doesn't Mean Ditch Your Diet, and Foods to Avoid on the Cardiac Diet  Written materials provided to patient: Yes    Goals/Plan:   Patient defined goals:   1) will try brown rice to replace white rice to add more fiber  2) will read labels on some sweets to evaluate sugar intake  3) will call Medicare plan to see if eligible for gym membership via [employer] option    Plan:  Encouraged to complete goals and continue setting new nutrition/exercise goals             Encouraged to follow up with dietitian during cardiac rehab sessions; may request additional RD counseling.    Kaylah Villanueva RD, 07:49 EDT - 3/12/2025

## 2025-03-12 NOTE — PROGRESS NOTES
Attended Phase II Cardiac Rehab. No medication or health history changes reported. See [facility] for details.    RD Consult #10  Exercise Session #11   Total time: 120 minutes.

## 2025-03-14 ENCOUNTER — APPOINTMENT (OUTPATIENT)
Dept: CARDIAC REHAB | Facility: HOSPITAL | Age: 80
End: 2025-03-14
Payer: MEDICARE

## 2025-03-14 ENCOUNTER — TREATMENT (OUTPATIENT)
Dept: CARDIAC REHAB | Facility: HOSPITAL | Age: 80
End: 2025-03-14
Payer: MEDICARE

## 2025-03-14 DIAGNOSIS — Z95.5 STENTED CORONARY ARTERY: Primary | ICD-10-CM

## 2025-03-14 PROCEDURE — 93798 PHYS/QHP OP CAR RHAB W/ECG: CPT

## 2025-03-17 ENCOUNTER — APPOINTMENT (OUTPATIENT)
Dept: CARDIAC REHAB | Facility: HOSPITAL | Age: 80
End: 2025-03-17
Payer: MEDICARE

## 2025-03-17 ENCOUNTER — TREATMENT (OUTPATIENT)
Dept: CARDIAC REHAB | Facility: HOSPITAL | Age: 80
End: 2025-03-17
Payer: MEDICARE

## 2025-03-17 DIAGNOSIS — Z95.5 STENTED CORONARY ARTERY: Primary | ICD-10-CM

## 2025-03-17 PROCEDURE — 93798 PHYS/QHP OP CAR RHAB W/ECG: CPT

## 2025-03-18 ENCOUNTER — OFFICE VISIT (OUTPATIENT)
Dept: CARDIOLOGY | Facility: CLINIC | Age: 80
End: 2025-03-18
Payer: MEDICARE

## 2025-03-18 ENCOUNTER — TELEPHONE (OUTPATIENT)
Dept: CARDIOLOGY | Facility: CLINIC | Age: 80
End: 2025-03-18

## 2025-03-18 VITALS
OXYGEN SATURATION: 95 % | HEIGHT: 64 IN | HEART RATE: 74 BPM | SYSTOLIC BLOOD PRESSURE: 128 MMHG | WEIGHT: 148 LBS | DIASTOLIC BLOOD PRESSURE: 80 MMHG | BODY MASS INDEX: 25.27 KG/M2

## 2025-03-18 DIAGNOSIS — I25.810 CORONARY ARTERY DISEASE INVOLVING CORONARY BYPASS GRAFT OF NATIVE HEART WITHOUT ANGINA PECTORIS: ICD-10-CM

## 2025-03-18 DIAGNOSIS — I10 HYPERTENSION, UNSPECIFIED TYPE: Primary | ICD-10-CM

## 2025-03-18 DIAGNOSIS — H34.11 CRAO (CENTRAL RETINAL ARTERY OCCLUSION), RIGHT: Chronic | ICD-10-CM

## 2025-03-18 NOTE — TELEPHONE ENCOUNTER
LVM for patient to schedule a 6 mo follow up in [place] w/ [facility] (9.23.25) per [facility].

## 2025-03-18 NOTE — PROGRESS NOTES
[facility] Cardiology  Follow Up Visit  Marilyn Bernal  1945    VISIT DATE:  03/18/25    PCP:   Lisa Napier MD  [id number] [address]          CC:  Stable angina pectoris      Problem List:  1.  Retinal artery occlusion  2.  Hypertension  3.  Hyperlipidemia  4.  Fall with prior pelvic and rib fractures  5.  Coronary artery disease        Cardiac testing:      Echo October 2021  Estimated right ventricular systolic pressure from tricuspid regurgitation is normal (<35     mmHg). Calculated right ventricular systolic pressure from tricuspid regurgitation is 31      mmHg.  Estimated left ventricular EF = 65% Estimated left ventricular EF was in agreement          with the calculated left ventricular EF. Left ventricular ejection fraction appears to be 61          - 65%. Left ventricular systolic function is normal.  Left ventricular diastolic function is consistent with age.  Normal right ventricular cavity size, wall thickness, systolic function and septal motion      noted.  No evidence of pulmonary hypertension is present.  There is no evidence of pericardial effusion.  The aortic valve exhibits mild sclerosis.  Saline test for shunting not performed.        Carotid duplex October 2021  Right internal carotid artery stenosis of 0-49%.  Left internal carotid artery stenosis of 0-49%.  Vertebral artery flow is antegrade bilaterally  Elevated velocities in the right subclavian artery suggesting a mild, less than 50% stenosis.     Holter October 2021  A relatively benign monitor study.  Short episodes atrial tachycardia. No episodes of atrial fibrillation. Longest atrial tach episode was 10 beats.  No other arrhythmias noted        Cardiac cath February 2025:    Tandem 80 and 70% proximal to mid LAD stenoses status post CHAD x 1.    LVEDP 12 mmHg       History of Present Illness:  Marilyn Bernal  Is a 79 y.o. female with pertinent cardiac history detailed above.   Since LAD PCI,  has not had any further severe chest pains.  She is participating in cardiac rehab.  Right now she is taking aspirin, Plavix, and Eliquis.  Since she is greater than 30 days out from the PCI I told her we can discontinue aspirin at this time.  Continue on the Plavix and Eliquis.  She is normotensive and lipids have been well-controlled.      Patient Active Problem List    Diagnosis Date Noted    Unstable angina 02/03/2025    Primary osteoarthritis involving multiple joints 07/05/2024    Central retinal vein occlusion, right eye, stable 07/05/2024    Other fatigue 07/05/2024    Balance disorder 11/18/2021    Unsteady gait 11/18/2021    Anxiety     Arthritis     Cataracts, bilateral     HL (hearing loss)     Hypertension     IBS (irritable bowel syndrome)     Migraine     Combined forms of age-related cataract of both eyes 07/20/2021    CRAO (central retinal artery occlusion), right 07/20/2021    Hyperopia of both eyes 07/20/2021    Open angle with borderline findings, high risk, bilateral 07/20/2021    Presbyopia 07/20/2021    Suspicious optic nerve cupping of both eyes 07/20/2021    Fibromyalgia 07/16/2021    Osteoporosis 07/16/2021       No Known Allergies    Social History     Socioeconomic History    Marital status:    Tobacco Use    Smoking status: Never    Smokeless tobacco: Never   Vaping Use    Vaping status: Never Used   Substance and Sexual Activity    Alcohol use: Yes     Alcohol/week: 10.0 standard drinks of alcohol     Types: 5 Glasses of wine, 5 Standard drinks or equivalent per week     Comment: 1 glass of wine per day    Drug use: Never    Sexual activity: Not Currently     Partners: Male       Family History   Problem Relation Age of Onset    Cancer Mother     Osteoporosis Mother     Stroke Mother     Glaucoma Mother     Colon cancer Mother     Heart attack Father     Asthma Sister     Migraines Son        Current Medications:    Current Outpatient Medications:     busPIRone  (BUSPAR) 10 MG tablet, Take 1 tablet by mouth 3 (Three) Times a Day., Disp: , Rfl:     calcium carbonate-vitamin d 600-400 MG-UNIT per tablet, Take 1 tablet by mouth Daily., Disp: , Rfl:     chlorhexidine (PERIDEX) 0.12 % solution, RINSE AND GARGLE 15 ML BY MOUTH TWICE DAILY, Disp: , Rfl:     cholecalciferol (VITAMIN D3) 25 MCG (1000 UT) tablet, Take 1 tablet by mouth Daily., Disp: , Rfl:     clopidogrel (PLAVIX) 75 MG tablet, Take 1 tablet by mouth Daily., Disp: 90 tablet, Rfl: 1    denosumab (Prolia) 60 MG/ML solution prefilled syringe syringe, Inject 1 mL under the skin into the appropriate area as directed Every 6 (Six) Months., Disp: , Rfl:     DULoxetine (CYMBALTA) 20 MG capsule, Take 1 capsule by mouth Daily., Disp: , Rfl:     Eliquis 5 MG tablet tablet, Take 1 tablet by mouth 2 (Two) Times a Day., Disp: , Rfl:     folic acid (FOLVITE) 1 MG tablet, Take 1 tablet by mouth Daily., Disp: , Rfl:     metoprolol succinate XL (TOPROL-XL) 50 MG 24 hr tablet, take 1 tablet by mouth daily, Disp: , Rfl:     multivitamin with minerals (Centrum Silver 50+Women) tablet tablet, Take 1 tablet by mouth Daily., Disp: , Rfl:     nitroglycerin (NITROSTAT) 0.4 MG SL tablet, 1 under the tongue as needed for angina, may repeat q5mins for up three doses, Disp: 30 tablet, Rfl: 6    NON FORMULARY, SWISH WITH 1 TEASPOON (5ML) BY MOUTH 3-4 TIMES A DAY AS NEEDED, FOR 2 MINUTES, THEN EXPECTORATE. DO NOT SWALLOW., Disp: , Rfl:     olmesartan (BENICAR) 40 MG tablet, take 1 tablet by mouth daily, Disp: , Rfl:     rosuvastatin (CRESTOR) 20 MG tablet, Take 1 tablet by mouth Daily., Disp: 90 tablet, Rfl: 3    saccharomyces boulardii (FLORASTOR) 250 MG capsule, Take 1 capsule by mouth 2 (Two) Times a Day., Disp: , Rfl:     timolol (TIMOPTIC) 0.5 % ophthalmic solution, Administer 1 drop to both eyes Daily., Disp: , Rfl:     traMADol (ULTRAM) 50 MG tablet, Take 1 tablet by mouth Every 6 (Six) Hours As Needed., Disp: , Rfl:     zolpidem CR (AMBIEN  "CR) 12.5 MG CR tablet, Take 1 tablet by mouth Every Night., Disp: , Rfl:      Review of Systems   Cardiovascular: Negative.    Respiratory: Negative.     Musculoskeletal:  Positive for myalgias.       Vitals:    03/18/25 1404   BP: 128/80   BP Location: Left arm   Patient Position: Sitting   Cuff Size: Adult   Pulse: 74   SpO2: 95%   Weight: 67.1 kg (148 lb)   Height: 162.6 cm (64\")       Physical Exam  Constitutional:       Appearance: Normal appearance.   Neck:      Vascular: No carotid bruit.   Cardiovascular:      Rate and Rhythm: Normal rate and regular rhythm.      Pulses: Normal pulses.      Heart sounds: Normal heart sounds.      Comments:   Right radial access with 2+ pulse  Pulmonary:      Effort: Pulmonary effort is normal.      Breath sounds: Normal breath sounds.   Neurological:      Mental Status: She is alert.         Diagnostic Data:  Procedures  Lab Results   Component Value Date    TRIG 88 02/06/2025    HDL 68 (H) 02/06/2025     Lab Results   Component Value Date    GLUCOSE 93 02/06/2025    BUN 17 02/06/2025    CREATININE 0.80 02/06/2025     02/06/2025    K 4.4 02/06/2025     02/06/2025    CO2 24.0 02/06/2025     Lab Results   Component Value Date    HGBA1C 5.30 02/06/2025     Lab Results   Component Value Date    WBC 8.44 02/06/2025    HGB 14.0 02/06/2025    HCT 42.5 02/06/2025     02/06/2025       Assessment:  No diagnosis found.    Plan:    1.  Right retinal artery occlusion  -Continue Eliquis, Plavix, statin at this time  -echo showed normal LVEF, no hemodynamically significant carotid stenosis on duplex  -Holter showed short episodes of atrial tachycardia longest 10 beats, no A. Fib     2.  Hypertension  -Benicar 20 mg  -Toprol-XL  -Blood pressure controlled, no changes made       3.  Hyperlipidemia  -Total cholesterol 149, HDL 60, LDL 65, triglycerides 88  -Crestor was recently increased to 20 mg     4.  Coronary artery disease  -Coronary CTA demonstrated greater than 70% " stenosis in the mid LAD.  Confirmed on cardiac catheterization  -Status post CHAD x 1  -Continue Plavix and Eliquis at this time.  Schedule follow-up in 6 months and at that time can switch from Plavix back to aspirin and Eliquis  -participating in cardiac rehab  -Discussed chest pain precautions          Reuben Hines MD [facility]

## 2025-03-19 ENCOUNTER — APPOINTMENT (OUTPATIENT)
Dept: CARDIAC REHAB | Facility: HOSPITAL | Age: 80
End: 2025-03-19
Payer: MEDICARE

## 2025-03-19 ENCOUNTER — TREATMENT (OUTPATIENT)
Dept: CARDIAC REHAB | Facility: HOSPITAL | Age: 80
End: 2025-03-19
Payer: MEDICARE

## 2025-03-19 DIAGNOSIS — Z95.5 STENTED CORONARY ARTERY: Primary | ICD-10-CM

## 2025-03-19 PROCEDURE — 93798 PHYS/QHP OP CAR RHAB W/ECG: CPT

## 2025-03-21 ENCOUNTER — APPOINTMENT (OUTPATIENT)
Dept: CARDIAC REHAB | Facility: HOSPITAL | Age: 80
End: 2025-03-21
Payer: MEDICARE

## 2025-03-21 ENCOUNTER — TREATMENT (OUTPATIENT)
Dept: CARDIAC REHAB | Facility: HOSPITAL | Age: 80
End: 2025-03-21
Payer: MEDICARE

## 2025-03-21 DIAGNOSIS — Z95.5 STENTED CORONARY ARTERY: Primary | ICD-10-CM

## 2025-03-21 PROCEDURE — 93798 PHYS/QHP OP CAR RHAB W/ECG: CPT

## 2025-03-24 ENCOUNTER — TREATMENT (OUTPATIENT)
Dept: CARDIAC REHAB | Facility: HOSPITAL | Age: 80
End: 2025-03-24
Payer: MEDICARE

## 2025-03-24 ENCOUNTER — APPOINTMENT (OUTPATIENT)
Dept: CARDIAC REHAB | Facility: HOSPITAL | Age: 80
End: 2025-03-24
Payer: MEDICARE

## 2025-03-24 DIAGNOSIS — Z95.5 STENTED CORONARY ARTERY: Primary | ICD-10-CM

## 2025-03-24 PROCEDURE — 93798 PHYS/QHP OP CAR RHAB W/ECG: CPT

## 2025-03-26 ENCOUNTER — APPOINTMENT (OUTPATIENT)
Dept: CARDIAC REHAB | Facility: HOSPITAL | Age: 80
End: 2025-03-26
Payer: MEDICARE

## 2025-03-26 ENCOUNTER — TREATMENT (OUTPATIENT)
Dept: CARDIAC REHAB | Facility: HOSPITAL | Age: 80
End: 2025-03-26
Payer: MEDICARE

## 2025-03-26 DIAGNOSIS — Z95.5 STENTED CORONARY ARTERY: Primary | ICD-10-CM

## 2025-03-26 PROCEDURE — 93798 PHYS/QHP OP CAR RHAB W/ECG: CPT

## 2025-03-28 ENCOUNTER — TREATMENT (OUTPATIENT)
Dept: CARDIAC REHAB | Facility: HOSPITAL | Age: 80
End: 2025-03-28
Payer: MEDICARE

## 2025-03-28 ENCOUNTER — APPOINTMENT (OUTPATIENT)
Dept: CARDIAC REHAB | Facility: HOSPITAL | Age: 80
End: 2025-03-28
Payer: MEDICARE

## 2025-03-28 DIAGNOSIS — Z95.5 STENTED CORONARY ARTERY: Primary | ICD-10-CM

## 2025-03-28 PROCEDURE — 93798 PHYS/QHP OP CAR RHAB W/ECG: CPT

## 2025-03-31 ENCOUNTER — APPOINTMENT (OUTPATIENT)
Dept: CARDIAC REHAB | Facility: HOSPITAL | Age: 80
End: 2025-03-31
Payer: MEDICARE

## 2025-03-31 ENCOUNTER — TREATMENT (OUTPATIENT)
Dept: CARDIAC REHAB | Facility: HOSPITAL | Age: 80
End: 2025-03-31
Payer: MEDICARE

## 2025-03-31 DIAGNOSIS — Z95.5 STENTED CORONARY ARTERY: Primary | ICD-10-CM

## 2025-03-31 PROCEDURE — 93798 PHYS/QHP OP CAR RHAB W/ECG: CPT

## 2025-04-02 ENCOUNTER — APPOINTMENT (OUTPATIENT)
Dept: CARDIAC REHAB | Facility: HOSPITAL | Age: 80
End: 2025-04-02
Payer: MEDICARE

## 2025-04-04 ENCOUNTER — APPOINTMENT (OUTPATIENT)
Dept: CARDIAC REHAB | Facility: HOSPITAL | Age: 80
End: 2025-04-04
Payer: MEDICARE

## 2025-04-04 ENCOUNTER — TELEPHONE (OUTPATIENT)
Dept: CARDIAC REHAB | Facility: HOSPITAL | Age: 80
End: 2025-04-04
Payer: MEDICARE

## 2025-04-04 NOTE — TELEPHONE ENCOUNTER
Patient called to cancel todays cardiac rehab session due to a stomach bug. Patient plans to return on Monday, 04/07/2025.

## 2025-04-07 ENCOUNTER — APPOINTMENT (OUTPATIENT)
Dept: CARDIAC REHAB | Facility: HOSPITAL | Age: 80
End: 2025-04-07
Payer: MEDICARE

## 2025-04-07 ENCOUNTER — TREATMENT (OUTPATIENT)
Dept: CARDIAC REHAB | Facility: HOSPITAL | Age: 80
End: 2025-04-07
Payer: MEDICARE

## 2025-04-07 DIAGNOSIS — Z95.5 STENTED CORONARY ARTERY: Primary | ICD-10-CM

## 2025-04-07 PROCEDURE — 93798 PHYS/QHP OP CAR RHAB W/ECG: CPT

## 2025-04-09 ENCOUNTER — TREATMENT (OUTPATIENT)
Dept: CARDIAC REHAB | Facility: HOSPITAL | Age: 80
End: 2025-04-09
Payer: MEDICARE

## 2025-04-09 ENCOUNTER — APPOINTMENT (OUTPATIENT)
Dept: CARDIAC REHAB | Facility: HOSPITAL | Age: 80
End: 2025-04-09
Payer: MEDICARE

## 2025-04-09 DIAGNOSIS — Z95.5 STENTED CORONARY ARTERY: Primary | ICD-10-CM

## 2025-04-09 PROCEDURE — 93798 PHYS/QHP OP CAR RHAB W/ECG: CPT

## 2025-04-11 ENCOUNTER — APPOINTMENT (OUTPATIENT)
Dept: CARDIAC REHAB | Facility: HOSPITAL | Age: 80
End: 2025-04-11
Payer: MEDICARE

## 2025-04-14 ENCOUNTER — TREATMENT (OUTPATIENT)
Dept: CARDIAC REHAB | Facility: HOSPITAL | Age: 80
End: 2025-04-14
Payer: MEDICARE

## 2025-04-14 ENCOUNTER — APPOINTMENT (OUTPATIENT)
Dept: CARDIAC REHAB | Facility: HOSPITAL | Age: 80
End: 2025-04-14
Payer: MEDICARE

## 2025-04-14 DIAGNOSIS — Z95.5 STENTED CORONARY ARTERY: Primary | ICD-10-CM

## 2025-04-14 PROCEDURE — 93798 PHYS/QHP OP CAR RHAB W/ECG: CPT

## 2025-04-16 ENCOUNTER — APPOINTMENT (OUTPATIENT)
Dept: CARDIAC REHAB | Facility: HOSPITAL | Age: 80
End: 2025-04-16
Payer: MEDICARE

## 2025-04-16 ENCOUNTER — TREATMENT (OUTPATIENT)
Dept: CARDIAC REHAB | Facility: HOSPITAL | Age: 80
End: 2025-04-16
Payer: MEDICARE

## 2025-04-16 DIAGNOSIS — Z95.5 STENTED CORONARY ARTERY: Primary | ICD-10-CM

## 2025-04-16 PROCEDURE — 93798 PHYS/QHP OP CAR RHAB W/ECG: CPT

## 2025-04-18 ENCOUNTER — TREATMENT (OUTPATIENT)
Dept: CARDIAC REHAB | Facility: HOSPITAL | Age: 80
End: 2025-04-18
Payer: MEDICARE

## 2025-04-18 ENCOUNTER — APPOINTMENT (OUTPATIENT)
Dept: CARDIAC REHAB | Facility: HOSPITAL | Age: 80
End: 2025-04-18
Payer: MEDICARE

## 2025-04-18 DIAGNOSIS — Z95.5 STENTED CORONARY ARTERY: Primary | ICD-10-CM

## 2025-04-18 PROCEDURE — 93798 PHYS/QHP OP CAR RHAB W/ECG: CPT

## 2025-04-21 ENCOUNTER — APPOINTMENT (OUTPATIENT)
Dept: CARDIAC REHAB | Facility: HOSPITAL | Age: 80
End: 2025-04-21
Payer: MEDICARE

## 2025-04-21 ENCOUNTER — TREATMENT (OUTPATIENT)
Dept: CARDIAC REHAB | Facility: HOSPITAL | Age: 80
End: 2025-04-21
Payer: MEDICARE

## 2025-04-21 DIAGNOSIS — Z95.5 STENTED CORONARY ARTERY: Primary | ICD-10-CM

## 2025-04-21 PROCEDURE — 93798 PHYS/QHP OP CAR RHAB W/ECG: CPT

## 2025-04-23 ENCOUNTER — APPOINTMENT (OUTPATIENT)
Dept: CARDIAC REHAB | Facility: HOSPITAL | Age: 80
End: 2025-04-23
Payer: MEDICARE

## 2025-04-23 ENCOUNTER — TREATMENT (OUTPATIENT)
Dept: CARDIAC REHAB | Facility: HOSPITAL | Age: 80
End: 2025-04-23
Payer: MEDICARE

## 2025-04-23 DIAGNOSIS — Z95.5 STENTED CORONARY ARTERY: Primary | ICD-10-CM

## 2025-04-23 PROCEDURE — 93798 PHYS/QHP OP CAR RHAB W/ECG: CPT

## 2025-04-25 ENCOUNTER — TREATMENT (OUTPATIENT)
Dept: CARDIAC REHAB | Facility: HOSPITAL | Age: 80
End: 2025-04-25
Payer: MEDICARE

## 2025-04-25 ENCOUNTER — APPOINTMENT (OUTPATIENT)
Dept: CARDIAC REHAB | Facility: HOSPITAL | Age: 80
End: 2025-04-25
Payer: MEDICARE

## 2025-04-25 DIAGNOSIS — Z95.5 STENTED CORONARY ARTERY: Primary | ICD-10-CM

## 2025-04-25 PROCEDURE — 93798 PHYS/QHP OP CAR RHAB W/ECG: CPT

## 2025-04-28 ENCOUNTER — APPOINTMENT (OUTPATIENT)
Dept: CARDIAC REHAB | Facility: HOSPITAL | Age: 80
End: 2025-04-28
Payer: MEDICARE

## 2025-04-28 ENCOUNTER — TREATMENT (OUTPATIENT)
Dept: CARDIAC REHAB | Facility: HOSPITAL | Age: 80
End: 2025-04-28
Payer: MEDICARE

## 2025-04-28 DIAGNOSIS — Z95.5 STENTED CORONARY ARTERY: Primary | ICD-10-CM

## 2025-04-28 PROCEDURE — 93798 PHYS/QHP OP CAR RHAB W/ECG: CPT

## 2025-04-30 ENCOUNTER — APPOINTMENT (OUTPATIENT)
Dept: CARDIAC REHAB | Facility: HOSPITAL | Age: 80
End: 2025-04-30
Payer: MEDICARE

## 2025-04-30 ENCOUNTER — TREATMENT (OUTPATIENT)
Dept: CARDIAC REHAB | Facility: HOSPITAL | Age: 80
End: 2025-04-30
Payer: MEDICARE

## 2025-04-30 DIAGNOSIS — Z95.5 STENTED CORONARY ARTERY: Primary | ICD-10-CM

## 2025-04-30 PROCEDURE — 93798 PHYS/QHP OP CAR RHAB W/ECG: CPT

## 2025-05-02 ENCOUNTER — TREATMENT (OUTPATIENT)
Dept: CARDIAC REHAB | Facility: HOSPITAL | Age: 80
End: 2025-05-02
Payer: MEDICARE

## 2025-05-02 ENCOUNTER — APPOINTMENT (OUTPATIENT)
Dept: CARDIAC REHAB | Facility: HOSPITAL | Age: 80
End: 2025-05-02
Payer: MEDICARE

## 2025-05-02 DIAGNOSIS — Z95.5 STENTED CORONARY ARTERY: Primary | ICD-10-CM

## 2025-05-02 PROCEDURE — 93798 PHYS/QHP OP CAR RHAB W/ECG: CPT

## 2025-05-05 ENCOUNTER — TREATMENT (OUTPATIENT)
Dept: CARDIAC REHAB | Facility: HOSPITAL | Age: 80
End: 2025-05-05
Payer: MEDICARE

## 2025-05-05 ENCOUNTER — APPOINTMENT (OUTPATIENT)
Dept: CARDIAC REHAB | Facility: HOSPITAL | Age: 80
End: 2025-05-05
Payer: MEDICARE

## 2025-05-05 DIAGNOSIS — Z95.5 STENTED CORONARY ARTERY: Primary | ICD-10-CM

## 2025-05-05 PROCEDURE — 93798 PHYS/QHP OP CAR RHAB W/ECG: CPT

## 2025-05-07 ENCOUNTER — TREATMENT (OUTPATIENT)
Dept: CARDIAC REHAB | Facility: HOSPITAL | Age: 80
End: 2025-05-07
Payer: MEDICARE

## 2025-05-07 ENCOUNTER — APPOINTMENT (OUTPATIENT)
Dept: CARDIAC REHAB | Facility: HOSPITAL | Age: 80
End: 2025-05-07
Payer: MEDICARE

## 2025-05-07 DIAGNOSIS — Z95.5 STENTED CORONARY ARTERY: Primary | ICD-10-CM

## 2025-05-07 PROCEDURE — 93798 PHYS/QHP OP CAR RHAB W/ECG: CPT

## 2025-05-09 ENCOUNTER — TREATMENT (OUTPATIENT)
Dept: CARDIAC REHAB | Facility: HOSPITAL | Age: 80
End: 2025-05-09
Payer: MEDICARE

## 2025-05-09 ENCOUNTER — APPOINTMENT (OUTPATIENT)
Dept: CARDIAC REHAB | Facility: HOSPITAL | Age: 80
End: 2025-05-09
Payer: MEDICARE

## 2025-05-09 DIAGNOSIS — Z95.5 STENTED CORONARY ARTERY: Primary | ICD-10-CM

## 2025-05-09 PROCEDURE — 93798 PHYS/QHP OP CAR RHAB W/ECG: CPT

## 2025-05-12 ENCOUNTER — APPOINTMENT (OUTPATIENT)
Dept: CARDIAC REHAB | Facility: HOSPITAL | Age: 80
End: 2025-05-12
Payer: MEDICARE

## 2025-05-12 ENCOUNTER — TREATMENT (OUTPATIENT)
Dept: CARDIAC REHAB | Facility: HOSPITAL | Age: 80
End: 2025-05-12
Payer: MEDICARE

## 2025-05-12 DIAGNOSIS — Z95.5 STENTED CORONARY ARTERY: Primary | ICD-10-CM

## 2025-05-12 PROCEDURE — 93798 PHYS/QHP OP CAR RHAB W/ECG: CPT

## 2025-05-13 NOTE — TELEPHONE ENCOUNTER
called to give patient detailed instructions regarding CTA including taking her toprol. patient verbalized understanding

## 2025-05-14 ENCOUNTER — APPOINTMENT (OUTPATIENT)
Dept: CARDIAC REHAB | Facility: HOSPITAL | Age: 80
End: 2025-05-14
Payer: MEDICARE

## 2025-05-16 ENCOUNTER — TREATMENT (OUTPATIENT)
Dept: CARDIAC REHAB | Facility: HOSPITAL | Age: 80
End: 2025-05-16
Payer: MEDICARE

## 2025-05-16 ENCOUNTER — APPOINTMENT (OUTPATIENT)
Dept: CARDIAC REHAB | Facility: HOSPITAL | Age: 80
End: 2025-05-16
Payer: MEDICARE

## 2025-05-16 DIAGNOSIS — Z95.5 STENTED CORONARY ARTERY: Primary | ICD-10-CM

## 2025-05-16 PROCEDURE — 93798 PHYS/QHP OP CAR RHAB W/ECG: CPT

## 2025-05-19 ENCOUNTER — TREATMENT (OUTPATIENT)
Dept: CARDIAC REHAB | Facility: HOSPITAL | Age: 80
End: 2025-05-19
Payer: MEDICARE

## 2025-05-19 ENCOUNTER — APPOINTMENT (OUTPATIENT)
Dept: CARDIAC REHAB | Facility: HOSPITAL | Age: 80
End: 2025-05-19
Payer: MEDICARE

## 2025-05-19 DIAGNOSIS — Z95.5 STENTED CORONARY ARTERY: Primary | ICD-10-CM

## 2025-05-19 PROCEDURE — 93798 PHYS/QHP OP CAR RHAB W/ECG: CPT

## 2025-05-19 NOTE — PROGRESS NOTES
Attended Phase II Cardiac Rehab. No medication or health history changes reported. See [facility] for details.    Patient will be discharged after completing 36 sessions.

## 2025-05-21 ENCOUNTER — APPOINTMENT (OUTPATIENT)
Dept: CARDIAC REHAB | Facility: HOSPITAL | Age: 80
End: 2025-05-21
Payer: MEDICARE

## 2025-05-23 ENCOUNTER — APPOINTMENT (OUTPATIENT)
Dept: CARDIAC REHAB | Facility: HOSPITAL | Age: 80
End: 2025-05-23
Payer: MEDICARE

## 2025-05-28 ENCOUNTER — APPOINTMENT (OUTPATIENT)
Dept: CARDIAC REHAB | Facility: HOSPITAL | Age: 80
End: 2025-05-28
Payer: MEDICARE

## 2025-05-30 ENCOUNTER — APPOINTMENT (OUTPATIENT)
Dept: CARDIAC REHAB | Facility: HOSPITAL | Age: 80
End: 2025-05-30
Payer: MEDICARE

## 2025-08-05 RX ORDER — CLOPIDOGREL BISULFATE 75 MG/1
75 TABLET ORAL DAILY
Qty: 90 TABLET | Refills: 1 | Status: SHIPPED | OUTPATIENT
Start: 2025-08-05

## 2025-08-26 ENCOUNTER — OFFICE VISIT (OUTPATIENT)
Age: 80
End: 2025-08-26
Payer: MEDICARE

## 2025-08-26 ENCOUNTER — TELEPHONE (OUTPATIENT)
Age: 80
End: 2025-08-26

## 2025-08-26 VITALS
BODY MASS INDEX: 25.66 KG/M2 | DIASTOLIC BLOOD PRESSURE: 80 MMHG | HEART RATE: 71 BPM | SYSTOLIC BLOOD PRESSURE: 122 MMHG | WEIGHT: 150.3 LBS | HEIGHT: 64 IN | TEMPERATURE: 97.1 F

## 2025-08-26 DIAGNOSIS — R26.89 BALANCE PROBLEM: ICD-10-CM

## 2025-08-26 DIAGNOSIS — M81.0 OSTEOPOROSIS, UNSPECIFIED OSTEOPOROSIS TYPE, UNSPECIFIED PATHOLOGICAL FRACTURE PRESENCE: ICD-10-CM

## 2025-08-26 DIAGNOSIS — M15.0 PRIMARY OSTEOARTHRITIS INVOLVING MULTIPLE JOINTS: Primary | ICD-10-CM

## 2025-08-26 DIAGNOSIS — M79.7 FIBROMYALGIA: ICD-10-CM

## 2025-08-26 DIAGNOSIS — H34.8112 CENTRAL RETINAL VEIN OCCLUSION, RIGHT EYE, STABLE: ICD-10-CM

## 2025-08-26 RX ORDER — KETOCONAZOLE 20 MG/ML
SHAMPOO, SUSPENSION TOPICAL
COMMUNITY
Start: 2025-06-04

## 2025-08-26 RX ORDER — LIDOCAINE HYDROCHLORIDE 20 MG/ML
SOLUTION OROPHARYNGEAL
COMMUNITY
Start: 2025-07-07 | End: 2025-08-26

## 2025-08-26 RX ORDER — DULOXETIN HYDROCHLORIDE 60 MG/1
60 CAPSULE, DELAYED RELEASE ORAL DAILY
COMMUNITY

## 2025-08-26 RX ORDER — TIMOLOL HEMIHYDRATE 5 MG/ML
SOLUTION/ DROPS OPHTHALMIC
COMMUNITY

## 2025-08-27 LAB
25(OH)D3+25(OH)D2 SERPL-MCNC: 63.8 NG/ML (ref 30–100)
ALBUMIN SERPL-MCNC: 4.5 G/DL (ref 3.5–5.2)
ALBUMIN/GLOB SERPL: 1.7 G/DL
ALP SERPL-CCNC: 96 U/L (ref 39–117)
ALT SERPL-CCNC: 23 U/L (ref 1–33)
AST SERPL-CCNC: 24 U/L (ref 1–32)
BILIRUB SERPL-MCNC: 0.3 MG/DL (ref 0–1.2)
BUN SERPL-MCNC: 18 MG/DL (ref 8–23)
BUN/CREAT SERPL: 18.2 (ref 7–25)
CALCIUM SERPL-MCNC: 9.7 MG/DL (ref 8.6–10.5)
CHLORIDE SERPL-SCNC: 100 MMOL/L (ref 98–107)
CO2 SERPL-SCNC: 24.5 MMOL/L (ref 22–29)
CREAT SERPL-MCNC: 0.99 MG/DL (ref 0.57–1)
EGFRCR SERPLBLD CKD-EPI 2021: 58.5 ML/MIN/1.73
GLOBULIN SER CALC-MCNC: 2.6 GM/DL
GLUCOSE SERPL-MCNC: 87 MG/DL (ref 65–99)
POTASSIUM SERPL-SCNC: 4.9 MMOL/L (ref 3.5–5.2)
PROT SERPL-MCNC: 7.1 G/DL (ref 6–8.5)
SODIUM SERPL-SCNC: 139 MMOL/L (ref 136–145)

## 2025-08-28 ENCOUNTER — TELEPHONE (OUTPATIENT)
Age: 80
End: 2025-08-28
Payer: MEDICARE

## (undated) DEVICE — DEV INFL MONARCH 25W

## (undated) DEVICE — GLIDESHEATH SLENDER STAINLESS STEEL KIT: Brand: GLIDESHEATH SLENDER

## (undated) DEVICE — TR BAND RADIAL ARTERY COMPRESSION DEVICE: Brand: TR BAND

## (undated) DEVICE — MODEL BT2000 P/N 700287-012KIT CONTENTS: MANIFOLD WITH SALINE AND CONTRAST PORTS, SALINE TUBING WITH SPIKE AND HAND SYRINGE, TRANSDUCER: Brand: BT2000 AUTOMATED MANIFOLD KIT

## (undated) DEVICE — NC TREK NEO™ CORONARY DILATATION CATHETER 2.75 MM X 12 MM / RAPID-EXCHANGE: Brand: NC TREK NEO™

## (undated) DEVICE — CATH DIAG EXPO M/ PK 5F FL4/FR4 PIG

## (undated) DEVICE — CVR PROB ULTRASND/TRANSD W/GEL 7X11IN STRL

## (undated) DEVICE — Device: Brand: ASAHI SION BLUE

## (undated) DEVICE — GW FC FLOP/TP .035 260CM 3MM

## (undated) DEVICE — KT CATH IMG DRAGONFLY/OPSTAR 2.7F 135CM

## (undated) DEVICE — GUIDE CATHETER: Brand: MACH1™

## (undated) DEVICE — ADULT, W/LG. BACK PAD, RADIOTRANSPARENT ELEMENT AND LEAD WIRE COMPATIBLE W/: Brand: DEFIBRILLATION ELECTRODES

## (undated) DEVICE — CATH DIAG EXPO .045 FL3.5 5F 100CM

## (undated) DEVICE — PK CATH CARD 10